# Patient Record
Sex: FEMALE | Race: WHITE | NOT HISPANIC OR LATINO | URBAN - METROPOLITAN AREA
[De-identification: names, ages, dates, MRNs, and addresses within clinical notes are randomized per-mention and may not be internally consistent; named-entity substitution may affect disease eponyms.]

---

## 2021-11-09 ENCOUNTER — APPOINTMENT (RX ONLY)
Dept: URBAN - METROPOLITAN AREA CLINIC 374 | Facility: CLINIC | Age: 36
Setting detail: DERMATOLOGY
End: 2021-11-09

## 2021-11-09 DIAGNOSIS — L64.8 OTHER ANDROGENIC ALOPECIA: ICD-10-CM

## 2021-11-09 DIAGNOSIS — Z41.9 ENCOUNTER FOR PROCEDURE FOR PURPOSES OTHER THAN REMEDYING HEALTH STATE, UNSPECIFIED: ICD-10-CM

## 2021-11-09 PROCEDURE — 11104 PUNCH BX SKIN SINGLE LESION: CPT

## 2021-11-09 PROCEDURE — ? ADDITIONAL NOTES

## 2021-11-09 PROCEDURE — ? MEDICAL CONSULTATION: CHEMICAL PEELS

## 2021-11-09 PROCEDURE — ? COUNSELING

## 2021-11-09 PROCEDURE — ? BIOPSY BY PUNCH METHOD

## 2021-11-09 ASSESSMENT — LOCATION DETAILED DESCRIPTION DERM: LOCATION DETAILED: LEFT SUPERIOR PARIETAL SCALP

## 2021-11-09 ASSESSMENT — LOCATION ZONE DERM: LOCATION ZONE: SCALP

## 2021-11-09 ASSESSMENT — LOCATION SIMPLE DESCRIPTION DERM: LOCATION SIMPLE: SCALP

## 2021-11-09 NOTE — PROCEDURE: ADDITIONAL NOTES
Detail Level: Zone
Additional Notes: Patient previously had lab work up showing iron deficiency and is currently taking a supplement
Render Risk Assessment In Note?: no

## 2021-11-09 NOTE — PROCEDURE: BIOPSY BY PUNCH METHOD
Detail Level: Detailed
Was A Bandage Applied: Yes
Punch Size In Mm: 3
Biopsy Type: H and E
Anesthesia Type: 1% lidocaine with epinephrine
Anesthesia Volume In Cc (Will Not Render If 0): 0.6
Additional Anesthesia Volume In Cc (Will Not Render If 0): 0
Hemostasis: Pro QR topical powder
Epidermal Sutures: none, closed by secondary intention
Wound Care: Vaseline
Dressing: no dressing applied
Patient Will Remove Sutures At Home?: No
Lab: -[id number]
Consent: Written consent was obtained and risks were reviewed including but not limited to scarring, infection, bleeding, scabbing, incomplete removal, nerve damage and allergy to anesthesia.
Post-Care Instructions: I reviewed with the patient in detail post-care instructions. Patient is to keep the biopsy site dry overnight, and then apply Vaseline or Aquaphor ointment twice daily until healed.
Home Suture Removal Text: Patient was provided a home suture removal kit and will remove their sutures at home.  If they have any questions or difficulties they will call the office.
Notification Instructions: Patient will be notified of biopsy results. However, patient instructed to call the office if not contacted within 2 weeks.
Billing Type: Third-Party Bill
Information: Selecting Yes will display possible errors in your note based on the variables you have selected. This validation is only offered as a suggestion for you. PLEASE NOTE THAT THE VALIDATION TEXT WILL BE REMOVED WHEN YOU FINALIZE YOUR NOTE. IF YOU WANT TO FAX A PRELIMINARY NOTE YOU WILL NEED TO TOGGLE THIS TO 'NO' IF YOU DO NOT WANT IT IN YOUR FAXED NOTE.

## 2021-11-09 NOTE — HPI: HAIR LOSS
Previous Labs: Yes
How Did The Hair Loss Occur?: sudden in onset
How Severe Is Your Hair Loss?: mild
When Were The Labs Drawn? (Drawn...): 10/1/21
Lab Details: Iron deficiency

## 2022-02-01 PROBLEM — F41.8 MIXED ANXIETY DEPRESSIVE DISORDER: Status: ACTIVE | Noted: 2022-02-01

## 2022-02-01 PROBLEM — M54.16 LUMBAR RADICULOPATHY: Status: ACTIVE | Noted: 2022-02-01

## 2022-02-01 ASSESSMENT — ENCOUNTER SYMPTOMS
RECTAL PAIN: 1
EYES NEGATIVE: 1
RESPIRATORY NEGATIVE: 1
PSYCHIATRIC NEGATIVE: 1
NEUROLOGICAL NEGATIVE: 1
ALLERGIC/IMMUNOLOGIC NEGATIVE: 1
CONSTITUTIONAL NEGATIVE: 1
ENDOCRINE NEGATIVE: 1
MUSCULOSKELETAL NEGATIVE: 1
HEMATOLOGIC/LYMPHATIC NEGATIVE: 1
CARDIOVASCULAR NEGATIVE: 1

## 2022-02-02 ENCOUNTER — OFFICE VISIT (OUTPATIENT)
Dept: PRIMARY CARE | Facility: CLINIC | Age: 37
End: 2022-02-02
Payer: COMMERCIAL

## 2022-02-02 VITALS
OXYGEN SATURATION: 99 % | SYSTOLIC BLOOD PRESSURE: 100 MMHG | DIASTOLIC BLOOD PRESSURE: 60 MMHG | TEMPERATURE: 98 F | RESPIRATION RATE: 16 BRPM | HEIGHT: 60 IN | WEIGHT: 107 LBS | BODY MASS INDEX: 21.01 KG/M2 | HEART RATE: 75 BPM

## 2022-02-02 DIAGNOSIS — Z13.9 SCREENING FOR CONDITION: ICD-10-CM

## 2022-02-02 DIAGNOSIS — Z00.00 GENERAL MEDICAL EXAM: Primary | ICD-10-CM

## 2022-02-02 DIAGNOSIS — K59.09 CHRONIC CONSTIPATION: ICD-10-CM

## 2022-02-02 DIAGNOSIS — E61.1 IRON DEFICIENCY: ICD-10-CM

## 2022-02-02 DIAGNOSIS — Z86.39 HISTORY OF VITAMIN D DEFICIENCY: ICD-10-CM

## 2022-02-02 DIAGNOSIS — Z83.3 FAMILY HISTORY OF DIABETES MELLITUS: ICD-10-CM

## 2022-02-02 DIAGNOSIS — F41.8 MIXED ANXIETY DEPRESSIVE DISORDER: ICD-10-CM

## 2022-02-02 PROCEDURE — 3008F BODY MASS INDEX DOCD: CPT | Performed by: STUDENT IN AN ORGANIZED HEALTH CARE EDUCATION/TRAINING PROGRAM

## 2022-02-02 PROCEDURE — 99395 PREV VISIT EST AGE 18-39: CPT | Mod: 25 | Performed by: STUDENT IN AN ORGANIZED HEALTH CARE EDUCATION/TRAINING PROGRAM

## 2022-02-02 PROCEDURE — 99204 OFFICE O/P NEW MOD 45 MIN: CPT | Performed by: STUDENT IN AN ORGANIZED HEALTH CARE EDUCATION/TRAINING PROGRAM

## 2022-02-02 RX ORDER — FLUOXETINE 10 MG/1
CAPSULE ORAL
COMMUNITY
Start: 2022-01-09 | End: 2022-07-27 | Stop reason: ALTCHOICE

## 2022-02-02 RX ORDER — FERROUS SULFATE 325(65) MG
TABLET ORAL
COMMUNITY
Start: 2022-01-09 | End: 2022-08-31

## 2022-02-02 ASSESSMENT — ENCOUNTER SYMPTOMS: CONSTIPATION: 1

## 2022-02-02 ASSESSMENT — PATIENT HEALTH QUESTIONNAIRE - PHQ9: SUM OF ALL RESPONSES TO PHQ9 QUESTIONS 1 & 2: 3

## 2022-02-02 NOTE — PROGRESS NOTES
"NEW PATIENT VISIT     WOMEN'S PRIMARY CARE   [facility]  TE STOCKTON M.D.  [phone]      HISTORY OF PRESENT ILLNESS - ASSESSMENT AND PLAN      CC:   Chief Complaint   Patient presents with   • Annual Exam     Linnette Rivera is a 36 y.o. female with a history of lumbar back pain with radiculopathy, chronic constipation, iron deficiency, anxiety and depression who presents for a new patient visit physical.     Moved from [place] in 2012, has PhD, lives with , no children currently.     Immunizations  Adult TDaP (every 10 years): Unsure when - has chart at home with vaccination record   COVID Vaccine: Up to date   Flu Vaccine (annual): Defers     Preventative Screening  Pap Smear (every 3-5 years until 65): Summer 2021 - [facility]  HIV testing: Negative with immigration to   Hep C testing: Never been tested that she is aware of     General Wellness  Nutrition: Well balanced - picky eater right now because she has braces, focusing on protein intake based on recent labwork   Physical Activity: Starting personal training 4x a week next week! Wants to start adding muscle.   Anxiety or depression: Yes - currently on Prozac  Seatbelt Safety: Always wears  Smoke Detector: Has in home     Specialists  Dentist for q6mo to annual exams: Yes  Eye Doctor for annual vision exams: Yes  Dermatologist for annual skin check: Yes - Dr. Diane Boone    Saw Derm for hair loss earlier this year, labs showed iron deficiency and \"low protein.\"     Saw Dr. Arriola at [facility] December 20, 2021 - note reviewed in Care Everywhere.   \"Anal pain and bleeding likely 2/2 anal fissures and hemorrhoids due to chronic constipation. No e/o fissures or hemorrhoids on anoscopy, but pt notes no pain over last few days. Symptoms most c/w anorectal fissures. Will pursue medical management and have pt RTC if sx do not improve after 6-8 weeks to discuss next steps.     We discussed the medical " "management for anal fissures. First, this involves avoiding hard stools and constipation. Stool softeners, fiber supplements, and/or miralax should be used. Depending on the location of the fissure, a topical calcium channel blocker can be used, such as nifedipine/lidocaine compound. This treatment takes up to 6 weeks for maximum effects. Sent Rx to pharmacy. If there is no improvement after this, then surgical treatment can be considered. Fiber and fissure handouts given.\"     Assessment   Problem List Items Addressed This Visit        Digestive    Chronic constipation (Chronic)    Current Assessment & Plan     Chronic, improving.  Continue fiber and water intake as recommended by Bernardo THOMPSON.          Iron deficiency    Current Assessment & Plan     Continue oral iron supplementation.   Lab recheck with Dr. Boone as planned.             Other    Mixed anxiety depressive disorder (Chronic)    Current Assessment & Plan     Chronic, stable.  Continue current Prozac as prescribed.            Relevant Medications    FLUoxetine (PROzac) 10 mg capsule    General medical exam - Primary    Current Assessment & Plan     Reviewed and updated all medical history, family history, social history, medications, and ensured patient up-to-date on all relevant immunizations and screening as indicated in orders below.   Asked patient to obtain prior vaccination records as indicated above.  Counseled patient on age-appropriate healthy habits:  Drink unsweetened beverages and aim to get 6-8 glasses of water daily.   Avoid regular use of ultra-processed foods, added nitrates or nitrites, sweetened beverages, and artifical sweeteners (sucralose, splenda, equal, sweet-n-low), as they dramatically increase insulin and  worsen insulin resistance. Insulin resistance is a the most common risk factor for weight dysregulation in the [place].   Avoid unnecessary antibiotics for yourself and in the meats and dairy products you use.  Limit " alcohol to less than 3 servings in a week.  Achieve adequate NEAT (Non-Exercise Activity Thermogenesis) every day: hourly movement  while awake for at least 250 steps (approximately 5 minutes out of each hour).  Hourly movement prevents metabolic slow down that can happen with prolonged sitting.   Participate in regular dedicated physical activity  --- at least 150 minutes per week. Make at least 60 of those physical activity minutes resistance training.   Identify personal stressors.  Modify what you can, and aim to balance what you cannot. Develop and practice daily relaxation techniques to balance stress.  Make time for rejuvenating activities and hobbies at least weekly.   Get adequate and restful sleep every night ---  7.5-8.5 hours a night is what most people require.  Consider a scheduled sleep and wake time if possible.                Family history of diabetes mellitus    Current Assessment & Plan     Patient requests to check fasting blood sugar.          Relevant Orders    Basic metabolic panel      Other Visit Diagnoses     Screening for condition        Relevant Orders    Lipid panel    Hepatitis C antibody    HIV 1,2 AB P24 AG    History of vitamin D deficiency        Relevant Orders    Vitamin D 25 hydroxy           PAST MEDICAL AND SURGICAL HISTORY        Past Medical History:   Diagnosis Date   • Chronic constipation 2/2/2022   • Iron deficiency 2/2/2022   • Lumbar radiculopathy 2/1/2022   • Mixed anxiety depressive disorder 2/1/2022       No past surgical history on file.  MEDICATIONS          Current Outpatient Medications:   •  ferrous sulfate 325 mg (65 mg iron) tablet, TAKE 1 DAILY INCREASE FIBER AND WATER INTAKE, Disp: , Rfl:   •  FLUoxetine (PROzac) 10 mg capsule, Take by mouth once daily., Disp: , Rfl:   ALLERGIES        Thimerosal  FAMILY HISTORY        Family History   Problem Relation Age of Onset   • Diabetes Biological Mother    • Hypertension Biological Mother    • Stroke Biological  Mother    • Alcohol abuse Biological Father      SOCIAL/ TOBACCO HISTORY        Social History     Tobacco Use   • Smoking status: Never Smoker   • Smokeless tobacco: Never Used   Substance Use Topics   • Alcohol use: Not Currently   • Drug use: Never     REVIEW OF SYSTEMS        Review of Systems   Constitutional: Negative.    HENT: Negative.    Eyes: Negative.    Respiratory: Negative.    Cardiovascular: Negative.    Gastrointestinal: Positive for constipation and rectal pain.   Endocrine: Negative.    Genitourinary: Negative.    Musculoskeletal: Negative.    Skin: Negative.    Allergic/Immunologic: Negative.    Neurological: Negative.    Hematological: Negative.    Psychiatric/Behavioral: Negative.       PHYSICAL EXAMINATION      Visit Vitals  /60   Pulse 75   Temp 36.7 °C (98 °F)   Resp 16   Ht 1.524 m (5')   Wt 48.5 kg (107 lb)   SpO2 99%   BMI 20.90 kg/m²        Physical Exam  Constitutional:       General: She is awake.      Appearance: Normal appearance. She is well-groomed.   HENT:      Head: Normocephalic and atraumatic.   Eyes:      General: Lids are normal.      Extraocular Movements: Extraocular movements intact.      Conjunctiva/sclera: Conjunctivae normal.   Neck:      Trachea: Phonation normal.   Pulmonary:      Effort: Pulmonary effort is normal.      Breath sounds: Normal breath sounds.   Skin:     General: Skin is warm and dry.   Neurological:      General: No focal deficit present.      Mental Status: She is alert and oriented to person, place, and time. Mental status is at baseline.   Psychiatric:         Attention and Perception: Attention and perception normal.         Mood and Affect: Mood and affect normal.         Speech: Speech normal.         Behavior: Behavior normal. Behavior is cooperative.         Thought Content: Thought content normal.         Cognition and Memory: Cognition and memory normal.         Judgment: Judgment normal.       PRIOR LABS        No results found for:  HGBA1C  No results found for: CHOL  No results found for: HDL  No results found for: LDLCALC  No results found for: TRIG  No results found for: CHOLHDL  No results found for: TSH  No results found for: WBC, HGB, HCT, MCV, PLT  No results found for: NA, K, CL, CO2, BUN, CREATININE, GLUCOSE, AST, ALT, PROTEIN, ALBUMIN, BILITOT, EGFR, ANIONGAP    TIME   I spent 45 minutes on this date of service performing the following activities: obtaining history, performing examination, entering orders, documenting, preparing for visit, obtaining / reviewing records and providing counseling and education.    Aspen Shine MD  2/2/2022

## 2022-02-02 NOTE — Clinical Note
Can we get a copy of her last pap Summer 2021 - [facility]

## 2022-02-02 NOTE — ASSESSMENT & PLAN NOTE
Reviewed and updated all medical history, family history, social history, medications, and ensured patient up-to-date on all relevant immunizations and screening as indicated in orders below.   Asked patient to obtain prior vaccination records as indicated above.  Counseled patient on age-appropriate healthy habits:  Drink unsweetened beverages and aim to get 6-8 glasses of water daily.   Avoid regular use of ultra-processed foods, added nitrates or nitrites, sweetened beverages, and artifical sweeteners (sucralose, splenda, equal, sweet-n-low), as they dramatically increase insulin and  worsen insulin resistance. Insulin resistance is a the most common risk factor for weight dysregulation in the [place].   Avoid unnecessary antibiotics for yourself and in the meats and dairy products you use.  Limit alcohol to less than 3 servings in a week.  Achieve adequate NEAT (Non-Exercise Activity Thermogenesis) every day: hourly movement  while awake for at least 250 steps (approximately 5 minutes out of each hour).  Hourly movement prevents metabolic slow down that can happen with prolonged sitting.   Participate in regular dedicated physical activity  --- at least 150 minutes per week. Make at least 60 of those physical activity minutes resistance training.   Identify personal stressors.  Modify what you can, and aim to balance what you cannot. Develop and practice daily relaxation techniques to balance stress.  Make time for rejuvenating activities and hobbies at least weekly.   Get adequate and restful sleep every night ---  7.5-8.5 hours a night is what most people require.  Consider a scheduled sleep and wake time if possible.

## 2022-02-02 NOTE — PATIENT INSTRUCTIONS
Please send me a [facility] Chart message with your vaccine record - I specifically want to know to know your last TDAP date. If you got your last TDAP greater than 10 years ago you are due.     TDaP vaccine which prevents infection with tetanus, diphtheria, and pertussis. This vaccine is recommended by the CDC every 10 years for adults. Please call your insurance to find out if you are covered for TDaP in a doctors office or a pharmacy. If you are covered for this vaccine in a doctors office, you can call or send a [facility] Chart message to schedule a vaccine slot to come in and receive the vaccine. You can receive this vaccine at the same time as other vaccines if needed. If you are not covered in a doctor's office, please receive this vaccine at a pharmacy and send me proof of vaccination to update your healthcare maintenance in your chart.     Diphtheria and pertussis spread from person to person. Tetanus enters the body through cuts or wounds.  TETANUS (T) causes painful stiffening of the muscles. Tetanus can lead to serious health problems, including being unable to open the mouth, having trouble swallowing and breathing, or death.  DIPHTHERIA (D) can lead to difficulty breathing, heart failure, paralysis, or death.  PERTUSSIS (aP), also known as “whooping cough,” can cause uncontrollable, violent coughing that makes it hard to breathe, eat, or drink. Pertussis can be extremely serious especially in babies and young children, causing pneumonia, convulsions, brain damage, or death. In teens and adults, it can cause weight loss, loss of bladder control, passing out, and rib fractures from severe coughing.

## 2022-05-17 DIAGNOSIS — E55.9 VITAMIN D DEFICIENCY: Primary | ICD-10-CM

## 2022-05-18 PROBLEM — E55.9 VITAMIN D DEFICIENCY: Status: ACTIVE | Noted: 2022-05-18

## 2022-05-18 LAB
25(OH)D3+25(OH)D2 SERPL-MCNC: 5.8 NG/ML (ref 30–100)
BUN SERPL-MCNC: 11 MG/DL (ref 6–20)
BUN/CREAT SERPL: 18 (ref 9–23)
CALCIUM SERPL-MCNC: 9.6 MG/DL (ref 8.7–10.2)
CHLORIDE SERPL-SCNC: 104 MMOL/L (ref 96–106)
CHOLEST SERPL-MCNC: 181 MG/DL (ref 100–199)
CO2 SERPL-SCNC: 24 MMOL/L (ref 20–29)
CREAT SERPL-MCNC: 0.62 MG/DL (ref 0.57–1)
EGFRCR SERPLBLD CKD-EPI 2021: 117 ML/MIN/1.73
GLUCOSE SERPL-MCNC: 81 MG/DL (ref 65–99)
HCV AB S/CO SERPL IA: 0.1 S/CO RATIO (ref 0–0.9)
HDLC SERPL-MCNC: 63 MG/DL
HIV 1+2 AB+HIV1 P24 AG SERPL QL IA: NON REACTIVE
LDLC SERPL CALC-MCNC: 104 MG/DL (ref 0–99)
POTASSIUM SERPL-SCNC: 4.3 MMOL/L (ref 3.5–5.2)
SODIUM SERPL-SCNC: 140 MMOL/L (ref 134–144)
TRIGL SERPL-MCNC: 77 MG/DL (ref 0–149)
VLDLC SERPL CALC-MCNC: 14 MG/DL (ref 5–40)

## 2022-05-18 RX ORDER — ASPIRIN 325 MG
50000 TABLET, DELAYED RELEASE (ENTERIC COATED) ORAL WEEKLY
Qty: 4 CAPSULE | Refills: 0 | Status: SHIPPED | OUTPATIENT
Start: 2022-05-18 | End: 2022-07-27 | Stop reason: ALTCHOICE

## 2022-06-09 ENCOUNTER — TELEPHONE (OUTPATIENT)
Dept: PRIMARY CARE | Facility: CLINIC | Age: 37
End: 2022-06-09
Payer: COMMERCIAL

## 2022-06-09 DIAGNOSIS — E55.9 VITAMIN D DEFICIENCY: Primary | ICD-10-CM

## 2022-06-09 DIAGNOSIS — E55.9 VITAMIN D DEFICIENCY: ICD-10-CM

## 2022-06-09 RX ORDER — ASPIRIN 325 MG
50000 TABLET, DELAYED RELEASE (ENTERIC COATED) ORAL WEEKLY
Qty: 4 CAPSULE | Refills: 0 | OUTPATIENT
Start: 2022-06-09 | End: 2022-07-01

## 2022-06-09 NOTE — TELEPHONE ENCOUNTER
[facility] to call patient and let her know after she has completed Rx for  Vitamin D3 50,000IU weekly for 4 weeks, she should continue taking either 2,000 IU daily or 10,000 IU weekly, this can be obtained over the counter. I would like for her to go back to the lab for us to recheck her vitamin D in 2-3 months. Order is on file.

## 2022-06-09 NOTE — TELEPHONE ENCOUNTER
Last Medical provider visit:           Last Dietitian or EP visit:    Next visit:   Comments: Refill Vit D3

## 2022-06-09 NOTE — TELEPHONE ENCOUNTER
Please call patient and let her know after she has completed Rx for  Vitamin D3 50,000IU weekly for 4 weeks, she should continue taking either 2,000 IU daily or 10,000 IU weekly, this can be obtained over the counter. I would like for her to go back to the lab for us to recheck her vitamin D in 2-3 months. Order is on file. Thank you!

## 2022-06-10 NOTE — TELEPHONE ENCOUNTER
Left voicemail to complete prescription Vit D3 for four week, thereafter 2000 IU daliy or 69048 IU weekly.Recheck Vit D in two-three months.

## 2022-07-27 ENCOUNTER — TELEPHONE (OUTPATIENT)
Dept: PRIMARY CARE | Facility: CLINIC | Age: 37
End: 2022-07-27

## 2022-07-27 ENCOUNTER — OFFICE VISIT (OUTPATIENT)
Dept: PRIMARY CARE | Facility: CLINIC | Age: 37
End: 2022-07-27
Payer: COMMERCIAL

## 2022-07-27 VITALS
HEART RATE: 70 BPM | BODY MASS INDEX: 20.03 KG/M2 | WEIGHT: 102 LBS | RESPIRATION RATE: 16 BRPM | DIASTOLIC BLOOD PRESSURE: 60 MMHG | HEIGHT: 60 IN | TEMPERATURE: 97.6 F | SYSTOLIC BLOOD PRESSURE: 108 MMHG | OXYGEN SATURATION: 99 %

## 2022-07-27 DIAGNOSIS — E55.9 VITAMIN D DEFICIENCY: ICD-10-CM

## 2022-07-27 DIAGNOSIS — F41.8 MIXED ANXIETY DEPRESSIVE DISORDER: Primary | ICD-10-CM

## 2022-07-27 PROCEDURE — 99214 OFFICE O/P EST MOD 30 MIN: CPT | Performed by: STUDENT IN AN ORGANIZED HEALTH CARE EDUCATION/TRAINING PROGRAM

## 2022-07-27 PROCEDURE — 3008F BODY MASS INDEX DOCD: CPT | Performed by: STUDENT IN AN ORGANIZED HEALTH CARE EDUCATION/TRAINING PROGRAM

## 2022-07-27 RX ORDER — ELAGOLIX 150 MG/1
1 TABLET, FILM COATED ORAL
COMMUNITY
Start: 2022-06-25 | End: 2022-08-31

## 2022-07-27 RX ORDER — SERTRALINE HYDROCHLORIDE 25 MG/1
25 TABLET, FILM COATED ORAL DAILY
Qty: 30 TABLET | Refills: 0 | Status: SHIPPED | OUTPATIENT
Start: 2022-07-27 | End: 2022-08-31 | Stop reason: SDUPTHER

## 2022-07-27 RX ORDER — SPIRONOLACTONE 25 MG/1
25 TABLET ORAL 2 TIMES DAILY WITH MEALS
COMMUNITY
Start: 2022-06-25 | End: 2022-08-31

## 2022-07-27 RX ORDER — ESCITALOPRAM OXALATE 5 MG/1
5 TABLET ORAL
COMMUNITY
Start: 2022-05-20 | End: 2022-07-27 | Stop reason: ALTCHOICE

## 2022-07-27 RX ORDER — VIT C/E/ZN/COPPR/LUTEIN/ZEAXAN 250MG-90MG
10000 CAPSULE ORAL WEEKLY
Qty: 12 CAPSULE | Refills: 3 | Status: SHIPPED | OUTPATIENT
Start: 2022-07-27 | End: 2022-08-31 | Stop reason: ALTCHOICE

## 2022-07-27 ASSESSMENT — ENCOUNTER SYMPTOMS
CONSTITUTIONAL NEGATIVE: 1
CARDIOVASCULAR NEGATIVE: 1
GASTROINTESTINAL NEGATIVE: 1
ENDOCRINE NEGATIVE: 1
MUSCULOSKELETAL NEGATIVE: 1
NEUROLOGICAL NEGATIVE: 1
RESPIRATORY NEGATIVE: 1
DYSPHORIC MOOD: 1
ALLERGIC/IMMUNOLOGIC NEGATIVE: 1
EYES NEGATIVE: 1
HEMATOLOGIC/LYMPHATIC NEGATIVE: 1
NERVOUS/ANXIOUS: 1

## 2022-07-27 NOTE — TELEPHONE ENCOUNTER
Linnette called to schedule an appointment to discuss her anti depressant medication and a possible change. She does not want to wait until October for an office visit. Please call patient back and advise.

## 2022-07-27 NOTE — PROGRESS NOTES
ESTABLISHED PATIENT OFFICE VISIT    WOMEN'S PRIMARY CARE   [facility] [place] TE STOCKTON M.D.  [phone]      HPI - ASSESSMENT AND PLAN      CC:   Chief Complaint   Patient presents with   • Depression     Linnette Rivera is a 36 y.o. female with a history of lumbar back pain with radiculopathy, chronic constipation, iron deficiency, anxiety and depression who presents for follow up.     She was last seen in the office 2/2/2022 to establish care.     She was diagnosed with depression in [id number]. Took her Initially took Lexapro 5mg for a few months in 2016 and felt better. Then felt symptoms come back and was also started on Prozac 10mg daily. She had stopped all medications in 2018 and had recurrence of symptoms in 2020, she was restarted on Lexapro and then Prozac added again. She no longer wants to be on medications. She has now been on Lexapro for 1.5 years and Prozac for 1 year. She has been reducing her doses on her own for 1 months. She is taking Prozac twice a week and Lexapro once a week. Takes both Prozac and Lexapro one day a week and then Prozac a second day later in the week. This was the taper her prior PCP recommended. She is not having any side effects currently. Feels like she is having changes in her moods, related to Orlissa which she is taking for endometriosis for the past 4-5 months. This has relieved her pain, but having lots of mood side effects.   Met with a therapist in [place] last week via [employer] - therapist feels like she has excessive anxiety causing depression and wants her to try a new medication.  Previously had SE to Wellbutrin. Never taken Zoloft.   Previously was on a higher dose of Prozac - 20mg caused insomnia and nightmares.   Not taking Vitamin D anymore - requests an Rx because she cannot remember to purchase OTC.      Assessment   Problem List Items Addressed This Visit        Digestive    Vitamin D deficiency     Restart Vitamin D 10,000K IU weekly with a meal  containing fat, get Vitamin D level rechecked in 3 months.              Relevant Medications    cholecalciferol (VITAMIN D3) 250 mcg (10,000 unit) capsule    Other Relevant Orders    Vitamin D 25 hydroxy       Other    Mixed anxiety depressive disorder - Primary (Chronic)     STOP Prozac and Escitalopram - do not take any more tablets.   Given her significant symptoms, we discussed trial of Zoloft 12.5mg daily for one week with plan to uptitrate to 25mg daily after one week.   Counseled patient on possible side effects of headache, nausea, diarrhea, dry mouth, increased sweating, feeling nervous, restless, fatigued, sleepy or having trouble sleeping (insomnia), these will often improve over the first week or two as you continue to take the medication.            Relevant Medications    sertraline (ZOLOFT) 25 mg tablet             PAST MEDICAL AND SURGICAL HISTORY        Past Medical History:   Diagnosis Date   • Chronic constipation 2/2/2022   • Iron deficiency 2/2/2022   • Lumbar radiculopathy 2/1/2022   • Mixed anxiety depressive disorder 2/1/2022   • Vitamin D deficiency 5/18/2022       No past surgical history on file.  MEDICATIONS          Current Outpatient Medications:   •  cholecalciferol (VITAMIN D3) 250 mcg (10,000 unit) capsule, Take 1 capsule (10,000 Units total) by mouth once a week., Disp: 12 capsule, Rfl: 3  •  ORILISSA 150 mg tablet, Take 1 tablet by mouth once daily., Disp: , Rfl:   •  sertraline (ZOLOFT) 25 mg tablet, Take 1 tablet (25 mg total) by mouth daily. Take 1/2 tablet by mouth daily for 1 week, then increase to 1 tablet daily., Disp: 30 tablet, Rfl: 0  •  spironolactone (ALDACTONE) 25 mg tablet, Take 25 mg by mouth 2 (two) times a day with meals., Disp: , Rfl:   •  ferrous sulfate 325 mg (65 mg iron) tablet, TAKE 1 DAILY INCREASE FIBER AND WATER INTAKE, Disp: , Rfl:   ALLERGIES        Thimerosal  FAMILY HISTORY        Family History   Problem Relation Age of Onset   • Diabetes Biological  Mother    • Hypertension Biological Mother    • Stroke Biological Mother    • Alcohol abuse Biological Father      SOCIAL/ TOBACCO HISTORY        Social History     Tobacco Use   • Smoking status: Never Smoker   • Smokeless tobacco: Never Used   Substance Use Topics   • Alcohol use: Not Currently   • Drug use: Never     REVIEW OF SYSTEMS        Review of Systems   Constitutional: Negative.    HENT: Negative.    Eyes: Negative.    Respiratory: Negative.    Cardiovascular: Negative.    Gastrointestinal: Negative.    Endocrine: Negative.    Genitourinary: Negative.    Musculoskeletal: Negative.    Skin: Negative.    Allergic/Immunologic: Negative.    Neurological: Negative.    Hematological: Negative.    Psychiatric/Behavioral: Positive for dysphoric mood. The patient is nervous/anxious.       PHYSICAL EXAMINATION     Visit Vitals  /60   Pulse 70   Temp 36.4 °C (97.6 °F)   Resp 16   Ht 1.524 m (5')   Wt 46.3 kg (102 lb)   SpO2 99%   BMI 19.92 kg/m²        Physical Exam  Constitutional:       General: She is awake.      Appearance: Normal appearance. She is well-groomed.   HENT:      Head: Normocephalic and atraumatic.   Eyes:      General: Lids are normal.      Extraocular Movements: Extraocular movements intact.      Conjunctiva/sclera: Conjunctivae normal.   Neck:      Trachea: Phonation normal.   Pulmonary:      Effort: Pulmonary effort is normal.      Breath sounds: Normal breath sounds.   Skin:     General: Skin is warm and dry.   Neurological:      General: No focal deficit present.      Mental Status: She is alert and oriented to person, place, and time. Mental status is at baseline.   Psychiatric:         Attention and Perception: Attention and perception normal.         Mood and Affect: Mood and affect normal.         Speech: Speech normal.         Behavior: Behavior normal. Behavior is cooperative.         Thought Content: Thought content normal.         Cognition and Memory: Cognition and memory  normal.         Judgment: Judgment normal.        TIME   I spent 25 minutes on this date of service performing the following activities: obtaining history, performing examination, entering orders, documenting, preparing for visit, obtaining / reviewing records and providing counseling and education.    Aspen Shine MD  7/27/2022

## 2022-07-27 NOTE — PATIENT INSTRUCTIONS
STOP Prozac and Escitalopram - do not take any more tablets.   Given her significant symptoms, we discussed trial of Zoloft 12.5mg daily for one week with plan to uptitrate to 25mg daily after one week.   Counseled patient on possible side effects of headache, nausea, diarrhea, dry mouth, increased sweating, feeling nervous, restless, fatigued, sleepy or having trouble sleeping (insomnia), these will often improve over the first week or two as you continue to take the medication.   Restart Vitamin D 10,000K IU weekly with a meal containing fat, get Vitamin D level rechecked in 3 months.

## 2022-07-27 NOTE — ASSESSMENT & PLAN NOTE
Restart Vitamin D 10,000K IU weekly with a meal containing fat, get Vitamin D level rechecked in 3 months.

## 2022-07-27 NOTE — ASSESSMENT & PLAN NOTE
STOP Prozac and Escitalopram - do not take any more tablets.   Given her significant symptoms, we discussed trial of Zoloft 12.5mg daily for one week with plan to uptitrate to 25mg daily after one week.   Counseled patient on possible side effects of headache, nausea, diarrhea, dry mouth, increased sweating, feeling nervous, restless, fatigued, sleepy or having trouble sleeping (insomnia), these will often improve over the first week or two as you continue to take the medication.

## 2022-08-31 ENCOUNTER — TELEMEDICINE (OUTPATIENT)
Dept: PRIMARY CARE | Facility: CLINIC | Age: 37
End: 2022-08-31
Payer: COMMERCIAL

## 2022-08-31 DIAGNOSIS — F41.8 MIXED ANXIETY DEPRESSIVE DISORDER: Primary | ICD-10-CM

## 2022-08-31 PROCEDURE — 99214 OFFICE O/P EST MOD 30 MIN: CPT | Mod: 95 | Performed by: STUDENT IN AN ORGANIZED HEALTH CARE EDUCATION/TRAINING PROGRAM

## 2022-08-31 RX ORDER — SERTRALINE HYDROCHLORIDE 25 MG/1
25 TABLET, FILM COATED ORAL DAILY
Qty: 90 TABLET | Refills: 1 | Status: SHIPPED | OUTPATIENT
Start: 2022-08-31 | End: 2022-11-22 | Stop reason: SDUPTHER

## 2022-09-01 ENCOUNTER — TELEPHONE (OUTPATIENT)
Dept: PRIMARY CARE | Facility: CLINIC | Age: 37
End: 2022-09-01
Payer: COMMERCIAL

## 2022-11-22 ENCOUNTER — TELEPHONE (OUTPATIENT)
Dept: PRIMARY CARE | Facility: CLINIC | Age: 37
End: 2022-11-22
Payer: COMMERCIAL

## 2022-11-22 DIAGNOSIS — F41.8 MIXED ANXIETY DEPRESSIVE DISORDER: ICD-10-CM

## 2022-11-22 RX ORDER — SERTRALINE HYDROCHLORIDE 25 MG/1
25 TABLET, FILM COATED ORAL DAILY
Qty: 90 TABLET | Refills: 1 | Status: SHIPPED | OUTPATIENT
Start: 2022-11-22 | End: 2022-12-21 | Stop reason: DRUGHIGH

## 2022-11-22 NOTE — TELEPHONE ENCOUNTER
Medicine Refill Request    Last Office: 7/27/2022   Last Consult Visit: Visit date not found  Last Telemedicine Visit: 8/31/2022 Aspen Shine MD    Next Appointment: 12/21/2022      Current Outpatient Medications:     sertraline (ZOLOFT) 25 mg tablet, Take 1 tablet (25 mg total) by mouth daily., Disp: 90 tablet, Rfl: 1

## 2022-12-20 ASSESSMENT — ENCOUNTER SYMPTOMS
NEUROLOGICAL NEGATIVE: 1
RESPIRATORY NEGATIVE: 1
CARDIOVASCULAR NEGATIVE: 1
CONSTITUTIONAL NEGATIVE: 1
ALLERGIC/IMMUNOLOGIC NEGATIVE: 1
ENDOCRINE NEGATIVE: 1
EYES NEGATIVE: 1
GASTROINTESTINAL NEGATIVE: 1
MUSCULOSKELETAL NEGATIVE: 1
HEMATOLOGIC/LYMPHATIC NEGATIVE: 1

## 2022-12-20 NOTE — PROGRESS NOTES
ESTABLISHED PATIENT OFFICE VISIT    WOM[facility]S PRIMARY [facility]   [facility] [place] [facility] M.D.  [phone]      HPI - ASSESSMENT AND PLAN      CC:   Chief Complaint   Patient presents with   • Med Management     Linnette Rivera is a 37 y.o. female with a history of lumbar back pain with radiculopathy, chronic constipation, iron deficiency, anxiety and depression who presents for follow up.     She was last seen in the office 7/27/2022  -Continued on Zoloft 25mg  -Encouraged follow up with Uzma Marin    Since that visit:  -She was not able to follow up with Uzma Marin, but is interested in establishing care with a therapist at this time.  -Changed her job and she is happy with her new position, but still feeling not her best and would be interested in increasing Zoloft, feels like her depression is getting worse, she is in her head a lot criticizing herself, feels like her mind is racing. She is overthinking and catastrophizing.     Assessment   Problem List Items Addressed This Visit        Mental Health    Mixed anxiety depressive disorder - Primary (Chronic)     Increase Zoloft to 50mg, you can take 2 of your current tablets and  Rx I sent in today.     For therapy I recommend calling [facility]: [phone]  OR  [facility]: [phone]  Select: Option #2 then Option #4  For more information visit [email or web address]          Relevant Medications    sertraline (ZOLOFT) 50 mg tablet          PAST MEDICAL AND SURGICAL HISTORY        Past Medical History:   Diagnosis Date   • Chronic constipation 2/2/2022   • Iron deficiency 2/2/2022   • Lumbar radiculopathy 2/1/2022   • Mixed anxiety depressive disorder 2/1/2022   • Vitamin D deficiency 5/18/2022       No past surgical history on file.  MEDICATIONS          Current Outpatient Medications:   •  sertraline (ZOLOFT) 50 mg tablet, Take 1 tablet (50 mg total) by mouth daily., Disp: 90 tablet,  Rfl: 0  ALLERGIES        Thimerosal  FAMILY HISTORY        Family History   Problem Relation Age of Onset   • Diabetes Biological Mother    • Hypertension Biological Mother    • Stroke Biological Mother    • Alcohol abuse Biological Father      SOCIAL/ TOBACCO HISTORY        Social History     Tobacco Use   • Smoking status: Never   • Smokeless tobacco: Never   Substance Use Topics   • Alcohol use: Not Currently   • Drug use: Never     REVIEW OF SYSTEMS        Review of Systems   Constitutional: Negative.    HENT: Negative.    Eyes: Negative.    Respiratory: Negative.    Cardiovascular: Negative.    Gastrointestinal: Negative.    Endocrine: Negative.    Genitourinary: Negative.    Musculoskeletal: Negative.    Skin: Negative.    Allergic/Immunologic: Negative.    Neurological: Negative.    Hematological: Negative.    Psychiatric/Behavioral: Positive for dysphoric mood. The patient is nervous/anxious.       PHYSICAL EXAMINATION     Visit Vitals  /70   Pulse 63   Temp 36.4 °C (97.6 °F)   Resp 16   Ht 1.524 m (5')   Wt 48.5 kg (107 lb)   SpO2 99%   BMI 20.90 kg/m²        Physical Exam  Constitutional:       General: She is awake.      Appearance: Normal appearance. She is well-groomed.   HENT:      Head: Normocephalic and atraumatic.   Eyes:      General: Lids are normal.      Extraocular Movements: Extraocular movements intact.      Conjunctiva/sclera: Conjunctivae normal.   Neck:      Trachea: Phonation normal.   Pulmonary:      Effort: Pulmonary effort is normal.      Breath sounds: Normal breath sounds.   Skin:     General: Skin is warm and dry.   Neurological:      General: No focal deficit present.      Mental Status: She is alert and oriented to person, place, and time. Mental status is at baseline.   Psychiatric:         Attention and Perception: Attention and perception normal.         Mood and Affect: Mood and affect normal.         Speech: Speech normal.         Behavior: Behavior normal. Behavior is  cooperative.         Thought Content: Thought content normal.         Cognition and Memory: Cognition and memory normal.         Judgment: Judgment normal.        TIME   I spent 15 minutes on this date of service performing the following activities: obtaining history, performing examination, entering orders, documenting, preparing for visit and providing counseling and education.    Aspen Shine MD  12/21/2022

## 2022-12-21 ENCOUNTER — OFFICE VISIT (OUTPATIENT)
Dept: PRIMARY CARE | Facility: CLINIC | Age: 37
End: 2022-12-21
Payer: COMMERCIAL

## 2022-12-21 VITALS
HEART RATE: 63 BPM | DIASTOLIC BLOOD PRESSURE: 70 MMHG | OXYGEN SATURATION: 99 % | RESPIRATION RATE: 16 BRPM | WEIGHT: 107 LBS | SYSTOLIC BLOOD PRESSURE: 108 MMHG | BODY MASS INDEX: 21.01 KG/M2 | TEMPERATURE: 97.6 F | HEIGHT: 60 IN

## 2022-12-21 DIAGNOSIS — F41.8 MIXED ANXIETY DEPRESSIVE DISORDER: Primary | Chronic | ICD-10-CM

## 2022-12-21 PROCEDURE — 99214 OFFICE O/P EST MOD 30 MIN: CPT | Performed by: STUDENT IN AN ORGANIZED HEALTH CARE EDUCATION/TRAINING PROGRAM

## 2022-12-21 PROCEDURE — 3008F BODY MASS INDEX DOCD: CPT | Performed by: STUDENT IN AN ORGANIZED HEALTH CARE EDUCATION/TRAINING PROGRAM

## 2022-12-21 RX ORDER — SERTRALINE HYDROCHLORIDE 50 MG/1
50 TABLET, FILM COATED ORAL DAILY
Qty: 90 TABLET | Refills: 0 | Status: SHIPPED | OUTPATIENT
Start: 2022-12-21 | End: 2023-02-21 | Stop reason: SDUPTHER

## 2022-12-21 ASSESSMENT — ENCOUNTER SYMPTOMS
NERVOUS/ANXIOUS: 1
DYSPHORIC MOOD: 1

## 2022-12-21 NOTE — PATIENT INSTRUCTIONS
Increase Zoloft to 50mg, you can take 2 of your current tablets and  Rx I sent in today.     For therapy I recommend calling [facility]: [phone]  OR  [facility]: [phone]  Select: Option #2 then Option #4  For more information visit [email or web address]

## 2022-12-21 NOTE — ASSESSMENT & PLAN NOTE
Increase Zoloft to 50mg, you can take 2 of your current tablets and  Rx I sent in today.     For therapy I recommend calling [facility]: [phone]  OR  [facility]: [phone]  Select: Option #2 then Option #4  For more information visit [email or web address]

## 2023-01-11 ENCOUNTER — TELEMEDICINE (OUTPATIENT)
Dept: PRIMARY CARE | Facility: CLINIC | Age: 38
End: 2023-01-11
Payer: COMMERCIAL

## 2023-01-11 DIAGNOSIS — Z30.011 BCP (BIRTH CONTROL PILLS) INITIATION: Primary | ICD-10-CM

## 2023-01-11 PROCEDURE — 99214 OFFICE O/P EST MOD 30 MIN: CPT | Mod: 95 | Performed by: STUDENT IN AN ORGANIZED HEALTH CARE EDUCATION/TRAINING PROGRAM

## 2023-01-11 RX ORDER — NORETHINDRONE 0.35 MG/1
1 TABLET ORAL DAILY
Qty: 28 TABLET | Refills: 11 | Status: SHIPPED | OUTPATIENT
Start: 2023-01-11 | End: 2023-02-08 | Stop reason: SDUPTHER

## 2023-01-11 RX ORDER — NORETHINDRONE 5 MG/1
5 TABLET ORAL DAILY
Qty: 14 TABLET | Refills: 0 | Status: SHIPPED | OUTPATIENT
Start: 2023-01-11 | End: 2023-03-22 | Stop reason: ALTCHOICE

## 2023-01-11 ASSESSMENT — ENCOUNTER SYMPTOMS
HEMATOLOGIC/LYMPHATIC NEGATIVE: 1
GASTROINTESTINAL NEGATIVE: 1
NEUROLOGICAL NEGATIVE: 1
EYES NEGATIVE: 1
CARDIOVASCULAR NEGATIVE: 1
CONSTITUTIONAL NEGATIVE: 1
MUSCULOSKELETAL NEGATIVE: 1
ALLERGIC/IMMUNOLOGIC NEGATIVE: 1
ENDOCRINE NEGATIVE: 1
PSYCHIATRIC NEGATIVE: 1
RESPIRATORY NEGATIVE: 1

## 2023-01-11 NOTE — PROGRESS NOTES
ESTABLISHED PATIENT TELEMED VISIT    WOMEN'S PRIMARY CARE   [facility]  ASPEN SHINE M.D.  [phone]      HPI - ASSESSMENT AND PLAN      Verification of Patient Location:  The patient affirms they are currently located in the following state: [place]     Audio and Video Encounter   Luz Marina, my name is Aspen Shine MD.  Before we proceed, can you please verify your identification by telling me your full name and date of birth?  Can you tell me who is in the room with you?    You and I are about to have a telemedicine check-in or visit because you have requested it.  This is a live video-conference.  I am a real person, speaking to you in real time.  There is no one else with me on the video-conference. I am not recording this conversation and I am asking you not to record it.  This telemedicine visit will be billed to your health insurance or you, if you are self-insured.  You understand you will be responsible for any copayments or coinsurances that apply to your telemedicine visit.  Communication platform used for this encounter:  [employer] Video Visit (Epic Video Client)       Before starting our telemedicine visit, I am required to get your consent for this virtual check-in or visit by telemedicine. Do you consent?     Patient Response to Request for Consent: Yes     CC:   Chief Complaint   Patient presents with   • Contraception     Linnette Rivera is a 37 y.o. female with a history of lumbar back pain with radiculopathy, chronic constipation, iron deficiency, anxiety and depression who presents for follow up to discuss birth control options.     She is currently condoms for birth control and would like to start oral contraceptive pill. She previously took Sheela.       Assessment   Problem List Items Addressed This Visit        Other    BCP (birth control pills) initiation - Primary     Women over the age of 35 are considered higher risk for blood clot on a birth control pill that contains  estrogen and progesterone, so I would like for you to start a progesterone only birth control pill.  Today I prescribed Micronor 0.35 mg tablets for you to begin taking after you return from your trip to [place].  Birth control pills to be effective, you should take these at the same time every single day.  If you miss a dose, you can take 2 pills the next day to make up for the missing doses.  If you miss 2 days, you can take 2 pills to make up for the missing 2 doses.  Not to miss any doses at all.  In order to prevent excessive menstrual bleeding on your trip, start Aygestin 5 mg daily when you began spotting. You can take this twice daily on the days when your menses are heavier if you are having any spotting.  This higher dose of progesterone can increase risk of blood clots and make sure you are moving around regularly and not sitting for long periods of time.         Relevant Medications    norethindrone (MICRONOR) 0.35 mg tablet    norethindrone (AYGESTIN) 5 mg tablet          PAST MEDICAL AND SURGICAL HISTORY        Past Medical History:   Diagnosis Date   • Chronic constipation 2/2/2022   • Iron deficiency 2/2/2022   • Lumbar radiculopathy 2/1/2022   • Mixed anxiety depressive disorder 2/1/2022   • Vitamin D deficiency 5/18/2022       No past surgical history on file.  MEDICATIONS          Current Outpatient Medications:   •  norethindrone (AYGESTIN) 5 mg tablet, Take 1 tablet (5 mg total) by mouth daily., Disp: 14 tablet, Rfl: 0  •  norethindrone (MICRONOR) 0.35 mg tablet, Take 1 tablet (0.35 mg total) by mouth daily., Disp: 28 tablet, Rfl: 11  •  sertraline (ZOLOFT) 50 mg tablet, Take 1 tablet (50 mg total) by mouth daily., Disp: 90 tablet, Rfl: 0  ALLERGIES        Thimerosal  FAMILY HISTORY        Family History   Problem Relation Age of Onset   • Diabetes Biological Mother    • Hypertension Biological Mother    • Stroke Biological Mother    • Alcohol abuse Biological Father      SOCIAL/ TOBACCO HISTORY         Social History     Tobacco Use   • Smoking status: Never   • Smokeless tobacco: Never   Substance Use Topics   • Alcohol use: Not Currently   • Drug use: Never     REVIEW OF SYSTEMS        Review of Systems   Constitutional: Negative.    HENT: Negative.    Eyes: Negative.    Respiratory: Negative.    Cardiovascular: Negative.    Gastrointestinal: Negative.    Endocrine: Negative.    Genitourinary: Negative.    Musculoskeletal: Negative.    Skin: Negative.    Allergic/Immunologic: Negative.    Neurological: Negative.    Hematological: Negative.    Psychiatric/Behavioral: Negative.       PHYSICAL EXAMINATION     There were no vitals taken for this visit.     Physical Exam  Constitutional:       General: She is awake.      Appearance: Normal appearance. She is well-groomed.   HENT:      Head: Normocephalic and atraumatic.      Right Ear: External ear normal.      Left Ear: External ear normal.      Nose: Nose normal.   Eyes:      General: Lids are normal.      Conjunctiva/sclera: Conjunctivae normal.   Neck:      Trachea: Phonation normal.   Pulmonary:      Effort: Pulmonary effort is normal.   Neurological:      General: No focal deficit present.      Mental Status: She is alert and oriented to person, place, and time. Mental status is at baseline.   Psychiatric:         Attention and Perception: Attention and perception normal.         Mood and Affect: Mood and affect normal.         Speech: Speech normal.         Behavior: Behavior normal. Behavior is cooperative.         Thought Content: Thought content normal.         Cognition and Memory: Cognition and memory normal.         Judgment: Judgment normal.        TIME   I spent 15 minutes on this date of service performing the following activities: obtaining history, performing examination, entering orders, documenting, preparing for visit and providing counseling and education.    Aspen Shine MD  1/11/2023

## 2023-01-11 NOTE — ASSESSMENT & PLAN NOTE
Women over the age of 35 are considered higher risk for blood clot on a birth control pill that contains estrogen and progesterone, so I would like for you to start a progesterone only birth control pill.  Today I prescribed Micronor 0.35 mg tablets for you to begin taking after you return from your trip to [place].  Birth control pills to be effective, you should take these at the same time every single day.  If you miss a dose, you can take 2 pills the next day to make up for the missing doses.  If you miss 2 days, you can take 2 pills to make up for the missing 2 doses.  Not to miss any doses at all.  In order to prevent excessive menstrual bleeding on your trip, start Aygestin 5 mg daily when you began spotting. You can take this twice daily on the days when your menses are heavier if you are having any spotting.  This higher dose of progesterone can increase risk of blood clots and make sure you are moving around regularly and not sitting for long periods of time.

## 2023-01-11 NOTE — PATIENT INSTRUCTIONS
Women over the age of 35 are considered higher risk for blood clot on a birth control pill that contains estrogen and progesterone, so I would like for you to start a progesterone only birth control pill.  Today I prescribed Micronor 0.35 mg tablets for you to begin taking after you return from your trip to [place].  Birth control pills to be effective, you should take these at the same time every single day.  If you miss a dose, you can take 2 pills the next day to make up for the missing doses.  If you miss 2 days, you can take 2 pills to make up for the missing 2 doses.  Not to miss any doses at all.  In order to prevent excessive menstrual bleeding on your trip, start Aygestin 5 mg daily when you began spotting. You can take this twice daily on the days when your menses are heavier if you are having any spotting.  This higher dose of progesterone can increase risk of blood clots and make sure you are moving around regularly and not sitting for long periods of time.

## 2023-02-02 DIAGNOSIS — Z30.011 BCP (BIRTH CONTROL PILLS) INITIATION: ICD-10-CM

## 2023-02-02 RX ORDER — NORETHINDRONE 0.35 MG/1
1 TABLET ORAL DAILY
Qty: 84 TABLET | Refills: 1 | OUTPATIENT
Start: 2023-02-02

## 2023-02-08 ENCOUNTER — DOCUMENTATION (OUTPATIENT)
Dept: PRIMARY CARE | Facility: CLINIC | Age: 38
End: 2023-02-08

## 2023-02-08 DIAGNOSIS — Z30.011 BCP (BIRTH CONTROL PILLS) INITIATION: ICD-10-CM

## 2023-02-08 RX ORDER — NORETHINDRONE 0.35 MG/1
1 TABLET ORAL DAILY
Qty: 84 TABLET | Refills: 3 | Status: SHIPPED | OUTPATIENT
Start: 2023-02-08 | End: 2023-03-22 | Stop reason: ALTCHOICE

## 2023-02-21 ENCOUNTER — TELEPHONE (OUTPATIENT)
Dept: ADMISSIONS | Facility: HOSPITAL | Age: 38
End: 2023-02-21
Payer: COMMERCIAL

## 2023-02-21 ENCOUNTER — TELEPHONE (OUTPATIENT)
Dept: PRIMARY CARE | Facility: CLINIC | Age: 38
End: 2023-02-21
Payer: COMMERCIAL

## 2023-02-21 RX ORDER — SERTRALINE HYDROCHLORIDE 50 MG/1
50 TABLET, FILM COATED ORAL DAILY
Qty: 90 TABLET | Refills: 0 | Status: SHIPPED | OUTPATIENT
Start: 2023-02-21 | End: 2023-02-23 | Stop reason: SDUPTHER

## 2023-02-21 NOTE — TELEPHONE ENCOUNTER
The patient called the office and left a message requesting a refill for her Zoloft medication and stated that her pharmacy informed her they could not request a refill.

## 2023-02-21 NOTE — TELEPHONE ENCOUNTER
Initial Intake    Linnette Rivera, a 37 y.o. female     General  Reason for seeking services (in client's own words)?: Pt being recommended to see therapist by her pcp.  Pt states she has hx of anxiety and depression.    Current Mental Health Symptoms  Current Symptoms: Depression; Anxiety  Depression: Decreased Energy; Decreased Sleep; Increased Sleep (lack of motivation)    Mental Health Treatment History  Prior Treatment Reported?: Yes  Type of Treatment: Outpatient    Outpatient  Details: pt has a therapist in the past none current    Medical  Extensive History: -- (endometrosis)  Medications: zoloft 50mg daily    Suicide Thoughts/Plans  Have you had suicidal thoughts in the past 72 hours?: No  Have you ever had suicidal thoughts?: Yes  Describe: passive si no plan or intent. 7 years ago  Do you have a plan?: No  Have you ever attempted?: No  Have you ever harmed yourself?: No  Do you have easy access to firearms?: No    Violence/Trauma  Do you currently have, or have you ever had, thoughts of harming someone else?: No  Any history of an event that you would consider emotionally/psychologically/physically traumatic?: no    Employment and Legal  Are you actively employed?: Yes  Are you presently or have you ever been a ? (Career or Volunteer): No  Do you now or have you in the past had any legal involvement?: No    Substance Use Details  Substance Use Includes: None      Eating Disorders  Do you have any problematic food related behaviors?: No  Have you ever been preoccupied with your looks or body image?: No    Additional Information  Determination: [facility] for Outpatient Therapy Evaluation

## 2023-02-23 ENCOUNTER — TELEPHONE (OUTPATIENT)
Dept: PRIMARY CARE | Facility: CLINIC | Age: 38
End: 2023-02-23
Payer: COMMERCIAL

## 2023-02-23 RX ORDER — SERTRALINE HYDROCHLORIDE 50 MG/1
50 TABLET, FILM COATED ORAL DAILY
Qty: 90 TABLET | Refills: 1 | Status: SHIPPED | OUTPATIENT
Start: 2023-02-23 | End: 2023-03-22 | Stop reason: SDUPTHER

## 2023-02-23 NOTE — TELEPHONE ENCOUNTER
Medication Request   Patient PCP: Aspen Shine MD  Next Office Visit: 3/22/2023  Has this provider prescribed this medication before?: Yes  Medication Name: sertraline (ZOLOFT  Medication Dose: 50 mg tablet  Medication Frequency: Take 1 tablet (50 mg total) by mouth daily  Preferred Pharmacy:   [facility] [id number] [place] [address], [address] [address]  Phone: [phone] Fax: [phone]      Please allow 2 business days for your provider to send your medication request or to reach out to discuss.

## 2023-03-15 ENCOUNTER — OFFICE VISIT (OUTPATIENT)
Dept: BEHAVIORAL HEALTH | Facility: CLINIC | Age: 38
End: 2023-03-15
Payer: COMMERCIAL

## 2023-03-15 DIAGNOSIS — F33.1 MODERATE EPISODE OF RECURRENT MAJOR DEPRESSIVE DISORDER (CMS/HCC): Primary | ICD-10-CM

## 2023-03-15 DIAGNOSIS — F41.1 GENERALIZED ANXIETY DISORDER: ICD-10-CM

## 2023-03-15 PROCEDURE — 90791 PSYCH DIAGNOSTIC EVALUATION: CPT | Performed by: PSYCHOLOGIST

## 2023-03-17 ASSESSMENT — COGNITIVE AND FUNCTIONAL STATUS - GENERAL
APPEARANCE: WELL GROOMED
PSYCHOMOTOR FUNCTIONING: WNL
AROUSAL LEVEL: ALERT
ORIENTATION: FULLY ORIENTED
SPEECH: REGULAR
EYE_CONTACT: WNL
MOOD: DEPRESSED;ANXIOUS
THOUGHT_CONTENT: APPROPRIATE
IMPULSE CONTROL: INTACT
INSIGHT: INTACT
AFFECT: TEARFUL

## 2023-03-17 NOTE — PROGRESS NOTES
[facility]- Outpatient Initial Visit    Visit Type Performed: In-office     Linnette Rivera presented today for a behavioral health visit.    Clinician confirmed identification of patient by name and birthdate.      Informed Consent/Confidentiality:  Patient was explained the model of mental healthcare we provide at [facility] ([facility]), including documentation visibility, the model of care, and confidentiality.  Model of care: This is a medium-intensity model of care, where we provide 12-20 visits of evidence-based psychotherapy. Patients always have the right to pursue other options for their behavioral healthcare (ie: longer-term outpatient psychotherapy, Intensive Outpatient Programs, Partial Hospitalization Programs, and Inpatient services).    Documentation: Psychologists and therapists (aka providers) of [facility] have access to see the progress notes. The visit diagnosis and appointment/scheduling information is visible to other providers who are listed as part of the patients' care team, to provide continuity of care across the care team, but they will not see the contents of the note. Patients have access to view their progress notes via [employer] (patient portal). Portal messages sent by patients are visible to providers and staff across [facility]. For portal communication with your [facility] provider, we recommend using the portal for non-clinical issues (ie: scheduling needs).     Confidentiality: Information shared by the patient with [facility] providers is kept confidential, and only discussed with their clinical supervisors. [facility] will only share information when patients make an informed written request (via Release of Information form) to have their information shared with a specific party. In rare circumstances, providers can be legally mandated by a 's court order to release information (this does not include subpoena's from attorneys).  Exceptions to confidentiality are made to ensure the safety of the patient or others (ie: to engage emergency services) if patients are in imminent risk of suicide or homicide. If child, elder, or other vulnerable persons abuse or neglect is reported, your healthcare providers must follow mandated reporting requirements.     Patient was given the opportunity to ask clarifying questions, and they expressed understanding and consent: Yes      SUBJECTIVE     History of Behavioral Health Treatment  Previous treatment: Previous therapy: yes  Previous psychiatric treatment and medication trials: previously on prozac  Depression screening was performed with standardized tool.  See below.   Previously experienced symptoms of: anxiety and depression  Psychotropic medication: Yes, currently prescribed zoloft 50mgOD      Substance Use History  ETOH/alcohol use: denied  Other substance or supplement use: drugs: denied.; tobacco: denied; caffeine: unspecified  Previous substance use treatment: N/A      Social History  Important people in pt's life/Support network: good relationship with spouse or significant other; reports good relationship with parents but indicates that she cannot share about everything with them.    Lives with: a spouse  Cultural practices/Islam/Spiritual beliefs pertinent to treatment: unspecified  Hobbies/Interests: painting; dogs  Additional pertinent historical information includes: Ph.D. in Economics; Working Full-Time, currently employed       Reported Symptoms  Depression symptoms: PHQ 9:  Little Interest or Pleasure in Doing Things: 3-->nearly every day    Feeling Down, Depressed or Hopeless: 2-->more than half the days    Trouble Falling or Staying Asleep, or Sleeping Too Much: 1-->several days    Feeling Tired or Having Little Energy: 3-->nearly every day    Poor Appetite or Overeatin-->not at all    Feeling Bad about Yourself - or that You are a Failure or Have Let Yourself or Your Family  Down: 3-->nearly every day    Trouble Concentrating on Things, Such as Reading the Newspaper or Watching Television: 2-->more than half the days    Moving or Speaking So Slowly that Other People Could Have Noticed? Or the Opposite - Being So Fidgety: 0-->not at all    Thoughts that You Would be Better Off Dead or of Hurting Yourself in Some Way: 2-->more than half the days    PHQ-9: Brief Depression Severity Measure Score: 16    If You Checked Off Any Problems, How Difficult Have These Problems Made It For You to Do Your Work, Take Care of Things at Home, or Get Along with Other People?: very difficult        Anxiety symptoms: GRACY-7  Feeling nervous, anxious or on edge: 3-->Nearly every day    Not being able to stop or control worrying: 3-->Nearly every day    Worrying too much about different things: 3-->Nearly every day    Trouble relaxing: 3-->Nearly every day    Being so restless that it is hard to sit still: 1-->Several days    Becoming easily annoyed or irritable: 3-->Nearly every day    Feeling afraid as if something awful might happen: 3-->Nearly every day      GAD7 Total Score: : 19      If you checked off any problems, how difficult have these made it for you to do your work, take care of things at home, or get along with other people?: Very difficult    Trauma Symptoms: none indicated  Temitope symptoms: denied  Additional reported symptoms: pain related to endometriosis      OBJECTIVE     Mental Status Exam  Appearance: Well Groomed  Speech: Regular  Psychomotor Functioning: WNL  Eye Contact: WNL  Orientation: Fully oriented  Thought Content: Appropriate  Insight: Intact  Affect: Tearful  Mood: Depressed, Anxious      ASSESSMENT     Psychotropic medications: no known adherence challenges, N/A   Current Outpatient Medications   Medication Sig Dispense Refill   • norethindrone (AYGESTIN) 5 mg tablet Take 1 tablet (5 mg total) by mouth daily. 14 tablet 0   • norethindrone (MICRONOR) 0.35 mg tablet Take 1  tablet (0.35 mg total) by mouth daily. 84 tablet 3   • sertraline (ZOLOFT) 50 mg tablet Take 1 tablet (50 mg total) by mouth daily. 90 tablet 1     No current facility-administered medications for this visit.       Suicidal Ideation/Homicidal Ideation Risk Assessment  Risk Factors: Presence of a Mood Disorder  Protective factors: Effective and accessible mental health care , Connectedness to individuals, family, community, and social institutions, Problem-solving and conflict resolution skills and Contacts with caregivers    Suicidal Ideation: Passive Ideation, No intention or plan.  Self Injurious Behavior:  Not Present  Homicidal Ideation: Not Present  Estimate of Current Risk: Minimal risk    Plan for Safety-   If pt develops passive SI or HI, they agreed to the following safety plan:    Warning signs: increasing isolation  Internal coping skills (things I can do on my own): painting  External coping skills (things that involve others): talk to   People I can call:   How I can make my environment safe: have a support person check in frequently  What makes life worth living: ; painting; career    If pt develops active SI or HI, pt agreed to use one of the following crisis resources:  [facility] Crisis Resources:   Mobile Crisis: 976.928.7125  Warm Lines: Peer Support Talk Line: 943.556.1911 (daily, 1pm-9pm)  Emergency Services: [phone]    General resources: call or text 988 (suicide and crisis lifeline), chat online via https://[employer]/, National Suicide Prevention Lifeline (1-656.375.6044) or text HOME to 848905, call 843 or go to the nearest ED or crisis center for an emergency psychiatric evaluation.                   Screening measures administered during this visit  PHQ-9: Brief Depression Severity Measure Score: 16  GAD7 Total Score: : 19      CLINICAL IMPRESSIONS  Linnette Rivera seems to be experiencing recurrent depression. She described last episode in 2016. She  reportedly recovered so well that she no longer took antidepressants. She also described therapy and self-help books as helpful during the last episode. She denied manic symptoms. PHQ and GRACY scores were significantly elevated. She will benefit from cognitive therapy interventions to help her manage her anxiety.   Severity: moderate, chronicity: acute and chronic, duration:6 months of presenting problem.  Prognostic protective factors include relationship support. Prognostic risk factors include history of depression.      PLAN     Goals:  Decrease depressive symptoms.   Identify automatic thoughts and core beliefs and respond to them effectively.     Recommendations for treatment: Individual Therapy, weekly    Recommendations for Interventions: Cognitive Behavior Therapy and Empathic Listening and Validation      Next visit plan  Continue to build rapport    I spent 60 minutes on this date of service performing the following activities: obtaining history, providing counseling and education and independently reviewing study/studies.

## 2023-03-22 ENCOUNTER — OFFICE VISIT (OUTPATIENT)
Dept: BEHAVIORAL HEALTH | Facility: CLINIC | Age: 38
End: 2023-03-22
Payer: COMMERCIAL

## 2023-03-22 ENCOUNTER — OFFICE VISIT (OUTPATIENT)
Dept: PRIMARY CARE | Facility: CLINIC | Age: 38
End: 2023-03-22
Payer: COMMERCIAL

## 2023-03-22 VITALS
HEIGHT: 60 IN | HEART RATE: 66 BPM | RESPIRATION RATE: 16 BRPM | TEMPERATURE: 97.6 F | SYSTOLIC BLOOD PRESSURE: 92 MMHG | DIASTOLIC BLOOD PRESSURE: 58 MMHG | BODY MASS INDEX: 20.81 KG/M2 | WEIGHT: 106 LBS | OXYGEN SATURATION: 99 %

## 2023-03-22 DIAGNOSIS — F33.1 MODERATE EPISODE OF RECURRENT MAJOR DEPRESSIVE DISORDER (CMS/HCC): Primary | ICD-10-CM

## 2023-03-22 DIAGNOSIS — F41.1 GENERALIZED ANXIETY DISORDER: ICD-10-CM

## 2023-03-22 DIAGNOSIS — F41.8 MIXED ANXIETY DEPRESSIVE DISORDER: Primary | Chronic | ICD-10-CM

## 2023-03-22 PROBLEM — Z30.011 BCP (BIRTH CONTROL PILLS) INITIATION: Status: RESOLVED | Noted: 2023-01-11 | Resolved: 2023-03-22

## 2023-03-22 PROCEDURE — 90834 PSYTX W PT 45 MINUTES: CPT | Performed by: PSYCHOLOGIST

## 2023-03-22 PROCEDURE — 99214 OFFICE O/P EST MOD 30 MIN: CPT | Performed by: STUDENT IN AN ORGANIZED HEALTH CARE EDUCATION/TRAINING PROGRAM

## 2023-03-22 PROCEDURE — 3008F BODY MASS INDEX DOCD: CPT | Performed by: STUDENT IN AN ORGANIZED HEALTH CARE EDUCATION/TRAINING PROGRAM

## 2023-03-22 RX ORDER — SERTRALINE HYDROCHLORIDE 50 MG/1
50 TABLET, FILM COATED ORAL DAILY
Qty: 90 TABLET | Refills: 3 | Status: SHIPPED | OUTPATIENT
Start: 2023-03-22 | End: 2023-04-03

## 2023-03-22 RX ORDER — SERTRALINE HYDROCHLORIDE 25 MG/1
25 TABLET, FILM COATED ORAL DAILY
Qty: 90 TABLET | Refills: 0 | Status: SHIPPED | OUTPATIENT
Start: 2023-03-22 | End: 2023-04-03

## 2023-03-22 RX ORDER — LEUPROLIDE ACETATE 3.75 MG
3.75 KIT INTRAMUSCULAR
COMMUNITY
Start: 2023-02-08 | End: 2024-04-03

## 2023-03-22 ASSESSMENT — ENCOUNTER SYMPTOMS
MUSCULOSKELETAL NEGATIVE: 1
HEMATOLOGIC/LYMPHATIC NEGATIVE: 1
EYES NEGATIVE: 1
GASTROINTESTINAL NEGATIVE: 1
NEUROLOGICAL NEGATIVE: 1
CONSTITUTIONAL NEGATIVE: 1
CARDIOVASCULAR NEGATIVE: 1
NERVOUS/ANXIOUS: 1
ALLERGIC/IMMUNOLOGIC NEGATIVE: 1
RESPIRATORY NEGATIVE: 1
ENDOCRINE NEGATIVE: 1
DYSPHORIC MOOD: 1

## 2023-03-22 NOTE — PATIENT INSTRUCTIONS
Given insufficient benefit of Zoloft 50mg, today we discusssed dose increase to 75mg daily, new Rx for 25mg tablet sent in to pharmacy for Linnette to combine with her current 50mg tablet.   Send me a My Chart Message in 2 weeks to let me know how you are doing.   Possible side effects include headache, nausea, diarrhea, dry mouth, increased sweating, feeling nervous, restless, fatigued, sleepy or having trouble sleeping (insomnia), these will often improve over the first week or two as you continue to take the medication.   I will send a message to Dr. Vieyra to let him know about this dose increase and ask him to continue to monitor your symptoms over time to see if you are improving or may benefit for an even higher dose.

## 2023-03-22 NOTE — PROGRESS NOTES
ESTABLISHED PATIENT OFFICE VISIT    [facility]   [facility] [place] [facility] [place] [facility] [place] M.D[facility]  [phone]      HPI - ASSESSMENT AND PLAN      CC:   Chief Complaint   Patient presents with   • Follow-up     Linnette Rivera is a 37 y.o. female with a history of lumbar back pain with radiculopathy, chronic constipation, iron deficiency, anxiety and depression who presents for follow up.     She had a good trip to [place] and did not get her period. She is not taking Micronor anymore after seeing Gyn - Endometriosis expert Dr. Carmelita Odom in February. She is now Lupron every month.     She feels like Zoloft 50mg is working well. She is seeing a therapist within [facility]- Brannon Vieyra. She does not want to change dose yet. She is not feeling well yet, but hopes she will be feel better once she has endometriosis surgery in May.       Assessment   Problem List Items Addressed This Visit        Mental Health    Mixed anxiety depressive disorder - Primary (Chronic)     Given insufficient benefit of Zoloft 50mg, today we discusssed dose increase to 75mg daily, new Rx for 25mg tablet sent in to pharmacy for Linnette to combine with her current 50mg tablet.   Send me a My Chart Message in 2 weeks to let me know how you are doing.   Possible side effects include headache, nausea, diarrhea, dry mouth, increased sweating, feeling nervous, restless, fatigued, sleepy or having trouble sleeping (insomnia), these will often improve over the first week or two as you continue to take the medication.   I will send a message to Dr. Vieyra to let him know about this dose increase and ask him to continue to monitor your symptoms over time to see if you are improving or may benefit for an even higher dose.            Relevant Medications    sertraline (ZOLOFT) 25 mg tablet    sertraline (ZOLOFT) 50 mg tablet          PAST MEDICAL AND SURGICAL HISTORY        Past Medical History:   Diagnosis Date   • Chronic constipation 2/2/2022    • Iron deficiency 2/2/2022   • Lumbar radiculopathy 2/1/2022   • Mixed anxiety depressive disorder 2/1/2022   • Vitamin D deficiency 5/18/2022       History reviewed. No pertinent surgical history.  MEDICATIONS          Current Outpatient Medications:   •  leuprolide (LUPRON DEPOT) 3.75 mg intramuscular injection, Inject 3.75 mg into the shoulder, thigh, or buttocks., Disp: , Rfl:   •  sertraline (ZOLOFT) 25 mg tablet, Take 1 tablet (25 mg total) by mouth daily., Disp: 90 tablet, Rfl: 0  •  sertraline (ZOLOFT) 50 mg tablet, Take 1 tablet (50 mg total) by mouth daily., Disp: 90 tablet, Rfl: 3  ALLERGIES        Thimerosal  FAMILY HISTORY        Family History   Problem Relation Age of Onset   • Diabetes Biological Mother    • Hypertension Biological Mother    • Stroke Biological Mother    • Alcohol abuse Biological Father      SOCIAL/ TOBACCO HISTORY        Social History     Tobacco Use   • Smoking status: Never   • Smokeless tobacco: Never   Substance Use Topics   • Alcohol use: Not Currently   • Drug use: Never     REVIEW OF SYSTEMS        Review of Systems   Constitutional: Negative.    HENT: Negative.    Eyes: Negative.    Respiratory: Negative.    Cardiovascular: Negative.    Gastrointestinal: Negative.    Endocrine: Negative.    Genitourinary: Negative.    Musculoskeletal: Negative.    Skin: Negative.    Allergic/Immunologic: Negative.    Neurological: Negative.    Hematological: Negative.    Psychiatric/Behavioral: Positive for dysphoric mood. The patient is nervous/anxious.       PHYSICAL EXAMINATION     Visit Vitals  BP (!) 92/58   Pulse 66   Temp 36.4 °C (97.6 °F)   Resp 16   Ht 1.524 m (5')   Wt 48.1 kg (106 lb)   SpO2 99%   BMI 20.70 kg/m²        Physical Exam  Constitutional:       General: She is awake.      Appearance: Normal appearance. She is well-groomed.   HENT:      Head: Normocephalic and atraumatic.   Eyes:      General: Lids are normal.      Extraocular Movements: Extraocular movements  intact.      Conjunctiva/sclera: Conjunctivae normal.   Neck:      Trachea: Phonation normal.   Pulmonary:      Effort: Pulmonary effort is normal.      Breath sounds: Normal breath sounds.   Skin:     General: Skin is warm and dry.   Neurological:      General: No focal deficit present.      Mental Status: She is alert and oriented to person, place, and time. Mental status is at baseline.   Psychiatric:         Attention and Perception: Attention and perception normal.         Mood and Affect: Mood and affect normal.         Speech: Speech normal.         Behavior: Behavior normal. Behavior is cooperative.         Thought Content: Thought content normal.         Cognition and Memory: Cognition and memory normal.         Judgment: Judgment normal.        TIME   I spent 15 minutes on this date of service performing the following activities: obtaining history, performing examination, entering orders, documenting, preparing for visit and providing counseling and education.    Aspen Shine MD  3/22/2023

## 2023-03-25 NOTE — PROGRESS NOTES
[facility] - Psychotherapy Follow-up Visit Note  Visit number: 2     Visit Type Performed: In-office     Linnette Rivera presented today for a behavioral health visit.          SUBJECTIVE     Symptoms  Depression symptoms: depressed mood and sadness  Anxiety symptoms: moderate anxiety/worry      OBJECTIVE     Mental Status Evaluation  Patient's mood and affect were consistent with the context, and consistent with their baseline: Yes   Comments:  calm and pleasant, awake and alert; oriented to person, place, and time    Additional Assessments completed this visit         Suicidal Ideation/Homicidal Ideation Risk Assessment: not assessed. If not assessed, reason:    Denied SI      Interventions  Acceptance and Commitment Therapy, Cognitive Behavior Therapy and Empathic Listening and Validation  Began to discuss automatic thoughts related to depression.     Psychotropic medications: no known adherence challenges, N/A   Current Outpatient Medications   Medication Sig Dispense Refill   • leuprolide (LUPRON DEPOT) 3.75 mg intramuscular injection Inject 3.75 mg into the shoulder, thigh, or buttocks.     • sertraline (ZOLOFT) 25 mg tablet Take 1 tablet (25 mg total) by mouth daily. 90 tablet 0   • sertraline (ZOLOFT) 50 mg tablet Take 1 tablet (50 mg total) by mouth daily. 90 tablet 3     No current facility-administered medications for this visit.       ASSESSMENT       Progress  Patient's progress toward their goals is generally unchanged (appeared less depressed; beginning to identify automatic thoughts related to her depression and anxiety. )   Patient's symptomology is unchanged Beginning to tackle automatic thoughts that contribute to depression.)       PLAN        Goals:  Decrease depressive symptoms.   Identify automatic thoughts and core beliefs and respond to them effectively.      Recommendations for treatment: Individual Therapy, weekly     Recommendations for Interventions: Cognitive Behavior  Therapy and Empathic Listening and Validation        Next visit plan  Continue to build rapport and address automatic thoughts related to depression     I spent 45 minutes on this date of service performing the following activities: obtaining history, providing counseling and education and independently reviewing study/studies.

## 2023-04-07 ENCOUNTER — TELEMEDICINE (OUTPATIENT)
Dept: BEHAVIORAL HEALTH | Facility: CLINIC | Age: 38
End: 2023-04-07
Payer: COMMERCIAL

## 2023-04-07 DIAGNOSIS — F41.1 GENERALIZED ANXIETY DISORDER: ICD-10-CM

## 2023-04-07 DIAGNOSIS — F33.1 MODERATE EPISODE OF RECURRENT MAJOR DEPRESSIVE DISORDER (CMS/HCC): Primary | ICD-10-CM

## 2023-04-07 PROCEDURE — 90834 PSYTX W PT 45 MINUTES: CPT | Mod: 95 | Performed by: PSYCHOLOGIST

## 2023-04-10 NOTE — PROGRESS NOTES
[facility] - Psychotherapy Follow-up Visit Note  Visit number: 3     Visit Type Performed: Video   Communication platform used for this encounter:  [employer] Video Visit (Epic Video Client)     Linnette Rivera was contacted today for a behavioral health visit.  Clinician confirmed identification of patient by name and birthdate, provider name, location of patient and clinician, and callback number in case disconnected.    Verification of Patient Location:  The patient affirms they are currently located in the following state: [place]  This visit was conducted via telehealth/telephone in lieu of an in-person visit due to precautions related to the COVID-19 pandemic.    Request for Consent:  You and I are about to have a tele-health check-in or visit. This is allowed because you are already a patient of our [facility] practice, and you have requested it.  This tele-health visit will be billed to your health insurance or you, if you are self-insured. You understand you will be responsible for any copayments or coinsurances that apply to your tele-health visit.  Before starting our telemedicine visit, I am required to get your consent for this virtual check-in or visit by tele-health . Do you consent?    Patient Response to Request for Consent: Yes      SUBJECTIVE     Symptoms  Depression symptoms: depressed mood and sadness  Anxiety symptoms: moderate anxiety/worry      OBJECTIVE     Mental Status Evaluation  Patient's mood and affect were consistent with the context, and consistent with their baseline: Yes   Comments:  calm and pleasant, awake and alert; oriented to person, place, and time    Additional Assessments completed this visit         Suicidal Ideation/Homicidal Ideation Risk Assessment: not assessed. 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 We identified that she would like her  to be more focused and driven as it will help her to feel less like all the responsibility for their financial future falls on her shoulders. We discussed how she can be more demonstrative at pointing this out to him.     Psychotropic medications: no known adherence challenges, N/A   Current Outpatient Medications   Medication Sig Dispense Refill   • leuprolide (LUPRON DEPOT) 3.75 mg intramuscular injection Inject 3.75 mg into the shoulder, thigh, or buttocks.     • sertraline (ZOLOFT) 100 mg tablet Take 1 tablet (100 mg total) by mouth daily. 90 tablet 0     No current facility-administered medications for this visit.       ASSESSMENT       Progress  Patient's progress toward their goals is generally unchanged (appears less depressed; identifies core belief that she has to be perfect as instrumental in getting her to this point in her career but ultimately unhelpful to her self-esteem. )   Patient's symptomology is unchanged Beginning to tackle automatic thoughts that contribute to depression.)       PLAN        Goals:  Decrease depressive symptoms.   Identify automatic thoughts and core beliefs and respond to them effectively.      Recommendations for treatment: Individual Therapy, weekly     Recommendations for Interventions: Cognitive Behavior Therapy and Empathic Listening and Validation        Next visit plan  Continue to build rapport and address the negative consequences of perfectionism     I spent 45 minutes on this date of service performing the following activities: obtaining history, providing counseling and education and independently reviewing study/studies.

## 2023-04-14 ENCOUNTER — TELEMEDICINE (OUTPATIENT)
Dept: BEHAVIORAL HEALTH | Facility: CLINIC | Age: 38
End: 2023-04-14
Payer: COMMERCIAL

## 2023-04-14 DIAGNOSIS — F41.1 GENERALIZED ANXIETY DISORDER: Primary | ICD-10-CM

## 2023-04-14 DIAGNOSIS — F33.1 MODERATE EPISODE OF RECURRENT MAJOR DEPRESSIVE DISORDER (CMS/HCC): ICD-10-CM

## 2023-04-14 PROCEDURE — 90834 PSYTX W PT 45 MINUTES: CPT | Mod: 95 | Performed by: PSYCHOLOGIST

## 2023-04-14 NOTE — PROGRESS NOTES
[facility] Behavioral Health Services - Psychotherapy Follow-up Visit Note  Visit number: 4    Visit Type Performed: Video   Communication platform used for this encounter:  [email or web address] Video Visit (Epic Video Client)     Linnette Rivera was contacted today for a behavioral health visit.  Clinician confirmed identification of patient by name and birthdate, provider name, location of patient and clinician, and callback number in case disconnected.    Verification of Patient Location:  The patient affirms they are currently located in the following state: [place]  This visit was conducted via telehealth/telephone in lieu of an in-person visit due to precautions related to the COVID-19 pandemic.    Request for Consent:  You and I are about to have a tele-health check-in or visit. This is allowed because you are already a patient of our [facility] practice, and you have requested it.  This tele-health visit will be billed to your health insurance or you, if you are self-insured. You understand you will be responsible for any copayments or coinsurances that apply to your tele-health visit.  Before starting our telemedicine visit, I am required to get your consent for this virtual check-in or visit by tele-health . Do you consent?    Patient Response to Request for Consent: Yes      SUBJECTIVE     Symptoms  Depression symptoms: depressed mood and sadness  Anxiety symptoms: moderate anxiety/worry      OBJECTIVE     Mental Status Evaluation  Patient's mood and affect were consistent with the context, and consistent with their baseline: Yes   Comments:  calm and pleasant, awake and alert; oriented to person, place, and time    Additional Assessments completed this visit         Suicidal Ideation/Homicidal Ideation Risk Assessment: not assessed. If not assessed, reason:    Denied SI      Interventions  Cognitive Behavior Therapy and Empathic Listening and Validation  Discussed her tendency to compare herself to others. We discussed helping  her get to know who she is outside of achievements, etc. We discussed the importance of choice with regard to what she decides to do and how hard she tries to push herself with her career, with her passion for painting. She discussed her perfectionism as stemming from her father's attitudes. She described coming from the a [employer] [place] in [place] [facility] [place] and building her career into what it is now.      Psychotropic medications: no known adherence challenges, N/A   Current Outpatient Medications   Medication Sig Dispense Refill   • leuprolide (LUPRON DEPOT) 3.75 mg intramuscular injection Inject 3.75 mg into the shoulder, thigh, or buttocks.     • sertraline (ZOLOFT) 100 mg tablet Take 1 tablet (100 mg total) by mouth daily. 90 tablet 0     No current facility-administered medications for this visit.       ASSESSMENT       Progress  Patient's progress toward their goals is generally improved somewhat. She is beginning to question her strong achievement orientation and tendency toward perfectionism. )   Patient's symptomology is unchanged Beginning to tackle automatic thoughts that contribute to depression. Today, she indicated that anxiety predates her depression.)       PLAN        Goals:  Decrease depressive symptoms.   Identify automatic thoughts and core beliefs and respond to them effectively.      Recommendations for treatment: Individual Therapy, weekly     Recommendations for Interventions: Cognitive Behavior Therapy and Empathic Listening and Validation        Next visit plan  Continue to build rapport and address the negative consequences of perfectionism     I spent 45 minutes on this date of service performing the following activities: obtaining history, providing counseling and education and independently reviewing study/studies.

## 2023-04-21 ENCOUNTER — TELEMEDICINE (OUTPATIENT)
Dept: BEHAVIORAL HEALTH | Facility: CLINIC | Age: 38
End: 2023-04-21
Payer: COMMERCIAL

## 2023-04-21 DIAGNOSIS — F33.1 MODERATE EPISODE OF RECURRENT MAJOR DEPRESSIVE DISORDER (CMS/HCC): ICD-10-CM

## 2023-04-21 DIAGNOSIS — F41.1 GENERALIZED ANXIETY DISORDER: Primary | ICD-10-CM

## 2023-04-21 PROCEDURE — 90834 PSYTX W PT 45 MINUTES: CPT | Mod: 95 | Performed by: PSYCHOLOGIST

## 2023-04-25 NOTE — PROGRESS NOTES
[facility] Behavioral Health Services - Psychotherapy Follow-up Visit Note  Visit number: 5    Visit Type Performed: Video   Communication platform used for this encounter:  [employer] Video Visit (Epic Video Client)     Linnette Rivera was contacted today for a behavioral health visit.  Clinician confirmed identification of patient by name and birthdate, provider name, location of patient and clinician, and callback number in case disconnected.    Verification of Patient Location:  The patient affirms they are currently located in the following state: [place]  This visit was conducted via telehealth/telephone in lieu of an in-person visit due to precautions related to the COVID-19 pandemic.    Request for Consent:  You and I are about to have a tele-health check-in or visit. This is allowed because you are already a patient of our [facility] practice, and you have requested it.  This tele-health visit will be billed to your health insurance or you, if you are self-insured. You understand you will be responsible for any copayments or coinsurances that apply to your tele-health visit.  Before starting our telemedicine visit, I am required to get your consent for this virtual check-in or visit by tele-health . Do you consent?    Patient Response to Request for Consent: Yes      SUBJECTIVE     Symptoms  Depression symptoms: depressed mood, anhedonia, fatigue/lack of energy, feelings of worthlessness/guilt and difficulty concentrating or making decisions  Anxiety symptoms: moderate anxiety/worry      OBJECTIVE     Mental Status Evaluation  Patient's mood and affect were consistent with the context, and consistent with their baseline: Yes   Comments:  calm and pleasant, awake and alert; oriented to person, place, and time    Additional Assessments completed this visit  PHQ-9: Brief Depression Severity Measure Score: 10  GAD7 Total Score: : 15      Suicidal Ideation/Homicidal Ideation Risk Assessment: not assessed. 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 Discussed cognitive restructuring of beliefs about self and perfection.     Psychotropic medications: no known adherence challenges, N/A   Current Outpatient Medications   Medication Sig Dispense Refill   • leuprolide (LUPRON DEPOT) 3.75 mg intramuscular injection Inject 3.75 mg into the shoulder, thigh, or buttocks.     • sertraline (ZOLOFT) 100 mg tablet Take 1 tablet (100 mg total) by mouth daily. 90 tablet 0     No current facility-administered medications for this visit.       ASSESSMENT       Progress  Patient's progress toward their goals is showing improvement. She is beginning to challenge previously held beliefs about herself particularly with regard to perfectionistic attitudes.    Patient's symptomology is gradually improving. She is beginning to tackle automatic thoughts that contribute to depression. Falling short of perfectionism contributes to ongoing feelings of sadness and worthlessness.)       PLAN        Goals:  Decrease depressive symptoms.   Identify automatic thoughts and core beliefs and respond to them effectively.      Recommendations for treatment: Individual Therapy, weekly     Recommendations for Interventions: Cognitive Behavior Therapy and Empathic Listening and Validation        Next visit plan  Utilize thought records to explore perfectionistic beliefs.      I spent 45 minutes on this date of service performing the following activities: obtaining history, providing counseling and education and independently reviewing study/studies.   [place]

## 2023-04-28 ENCOUNTER — TELEMEDICINE (OUTPATIENT)
Dept: BEHAVIORAL HEALTH | Facility: CLINIC | Age: 38
End: 2023-04-28
Payer: COMMERCIAL

## 2023-04-28 DIAGNOSIS — F41.1 GENERALIZED ANXIETY DISORDER: Primary | ICD-10-CM

## 2023-04-28 DIAGNOSIS — F33.1 MODERATE EPISODE OF RECURRENT MAJOR DEPRESSIVE DISORDER (CMS/HCC): ICD-10-CM

## 2023-04-28 PROCEDURE — 90834 PSYTX W PT 45 MINUTES: CPT | Mod: 95 | Performed by: PSYCHOLOGIST

## 2023-05-01 NOTE — PROGRESS NOTES
[facility] Behavioral Health Services - Psychotherapy Follow-up Visit Note  Visit number: 6    Visit Type Performed: Video   Communication platform used for this encounter:  [employer] Video Visit (Epic Video Client)     Linnette Rivera was contacted today for a behavioral health visit.  Clinician confirmed identification of patient by name and birthdate, provider name, location of patient and clinician, and callback number in case disconnected.    Verification of Patient Location:  The patient affirms they are currently located in the following state: [place]  This visit was conducted via telehealth/telephone in lieu of an in-person visit due to precautions related to the COVID-19 pandemic.    Request for Consent:  You and I are about to have a tele-health check-in or visit. This is allowed because you are already a patient of our [facility] practice, and you have requested it.  This tele-health visit will be billed to your health insurance or you, if you are self-insured. You understand you will be responsible for any copayments or coinsurances that apply to your tele-health visit.  Before starting our telemedicine visit, I am required to get your consent for this virtual check-in or visit by tele-health . Do you consent?    Patient Response to Request for Consent: Yes      SUBJECTIVE     Symptoms  Depression symptoms: fatigue/lack of energy and feelings of worthlessness/guilt  Anxiety symptoms: moderate anxiety/worry      OBJECTIVE     Mental Status Evaluation  Patient's mood and affect were consistent with the context, and consistent with their baseline: Yes   Comments:  calm and pleasant, awake and alert; oriented to person, place, and time    Additional Assessments completed this visit         Suicidal Ideation/Homicidal Ideation Risk Assessment: not assessed. If not assessed, reason:    Denied SI      Interventions  Cognitive Behavior Therapy and Empathic Listening and Validation  Discussed chronic anxiety related to  perfectionism. She indicated that she was planning a party with her  for several friends. She described having a high standard for these parties. We discussed converting her mindset to on one of experience instead of performance.     Psychotropic medications: no known adherence challenges, N/A   Current Outpatient Medications   Medication Sig Dispense Refill   • leuprolide (LUPRON DEPOT) 3.75 mg intramuscular injection Inject 3.75 mg into the shoulder, thigh, or buttocks.     • sertraline (ZOLOFT) 100 mg tablet Take 1 tablet (100 mg total) by mouth daily. 90 tablet 3   • sertraline (ZOLOFT) 25 mg tablet Take 1 tablet (25 mg total) by mouth daily. 90 tablet 3     No current facility-administered medications for this visit.       ASSESSMENT       Progress  Patient's progress toward their goals is showing improvement. She is beginning to challenge previously held beliefs about herself particularly with regard to perfectionistic attitudes.    Patient's symptomology is gradually improving. Appears less depressed, dysphoric.        PLAN        Goals:  Decrease depressive symptoms.   Identify automatic thoughts and core beliefs and respond to them effectively.      Recommendations for treatment: Individual Therapy, weekly     Recommendations for Interventions: Cognitive Behavior Therapy and Empathic Listening and Validation        Next visit plan  Continue to discuss ways in which she can focus on the experience aspect of various tasks at work, in art studio, hosting guests.      I spent 45 minutes on this date of service performing the following activities: obtaining history, providing counseling and education and independently reviewing study/studies.

## 2023-05-08 ENCOUNTER — TELEMEDICINE (OUTPATIENT)
Dept: BEHAVIORAL HEALTH | Facility: CLINIC | Age: 38
End: 2023-05-08
Payer: COMMERCIAL

## 2023-05-08 DIAGNOSIS — F33.1 MODERATE EPISODE OF RECURRENT MAJOR DEPRESSIVE DISORDER (CMS/HCC): ICD-10-CM

## 2023-05-08 DIAGNOSIS — F41.1 GENERALIZED ANXIETY DISORDER: Primary | ICD-10-CM

## 2023-05-08 PROCEDURE — 90834 PSYTX W PT 45 MINUTES: CPT | Mod: 95 | Performed by: PSYCHOLOGIST

## 2023-05-09 NOTE — PROGRESS NOTES
[facility] Behavioral Health Services - Psychotherapy Follow-up Visit Note  Visit number: 7    Visit Type Performed: Video   Communication platform used for this encounter:  [employer] Video Visit (Epic Video Client)     Linnette Rivera was contacted today for a behavioral health visit.  Clinician confirmed identification of patient by name and birthdate, provider name, location of patient and clinician, and callback number in case disconnected.    Verification of Patient Location:  The patient affirms they are currently located in the following state: [place]  This visit was conducted via telehealth/telephone in lieu of an in-person visit due to precautions related to the COVID-19 pandemic.    Request for Consent:  You and I are about to have a tele-health check-in or visit. This is allowed because you are already a patient of our [facility] practice, and you have requested it.  This tele-health visit will be billed to your health insurance or you, if you are self-insured. You understand you will be responsible for any copayments or coinsurances that apply to your tele-health visit.  Before starting our telemedicine visit, I am required to get your consent for this virtual check-in or visit by tele-health . Do you consent?    Patient Response to Request for Consent: Yes      SUBJECTIVE     Symptoms  Depression symptoms: none reported  Anxiety symptoms: mild anxiety/worry      OBJECTIVE     Mental Status Evaluation  Patient's mood and affect were consistent with the context, and consistent with their baseline: Yes   Comments:  calm and pleasant, awake and alert; oriented to person, place, and time    Additional Assessments completed this visit         Suicidal Ideation/Homicidal Ideation Risk Assessment: not assessed. If not assessed, reason:    Denied SI      Interventions  Empathic Listening and Validation  Discussed improvements in mood.     Psychotropic medications: no known adherence challenges, N/A   Current Outpatient  Medications   Medication Sig Dispense Refill   • leuprolide (LUPRON DEPOT) 3.75 mg intramuscular injection Inject 3.75 mg into the shoulder, thigh, or buttocks.     • sertraline (ZOLOFT) 100 mg tablet Take 1 tablet (100 mg total) by mouth daily. 90 tablet 3   • sertraline (ZOLOFT) 25 mg tablet Take 1 tablet (25 mg total) by mouth daily. 90 tablet 3     No current facility-administered medications for this visit.       ASSESSMENT       Progress  Patient continues to show improvement. She denied worsening mood or anxiety. She reports and seems to be struggling with perfectionism much less.   Patient's symptomology is gradually improving.  Affect brighter, reports less depression.        PLAN        Goals:  Decrease depressive symptoms.   Identify automatic thoughts and core beliefs and respond to them effectively.      Recommendations for treatment: Individual Therapy, weekly     Recommendations for Interventions: Cognitive Behavior Therapy and Empathic Listening and Validation        Next visit plan  Discuss moving from perfectionism to assessment of experience of doing things at work, home, socially.   I spent 45 minutes on this date of service performing the following activities: obtaining history, providing counseling and education and independently reviewing study/studies.

## 2023-06-09 ENCOUNTER — TELEMEDICINE (OUTPATIENT)
Dept: BEHAVIORAL HEALTH | Facility: CLINIC | Age: 38
End: 2023-06-09
Payer: COMMERCIAL

## 2023-06-09 DIAGNOSIS — F41.1 GENERALIZED ANXIETY DISORDER: Primary | ICD-10-CM

## 2023-06-09 DIAGNOSIS — F33.1 MODERATE EPISODE OF RECURRENT MAJOR DEPRESSIVE DISORDER (CMS/HCC): ICD-10-CM

## 2023-06-09 PROCEDURE — 90834 PSYTX W PT 45 MINUTES: CPT | Mod: 95 | Performed by: PSYCHOLOGIST

## 2023-06-10 NOTE — PROGRESS NOTES
[facility] Behavioral Health Services - Psychotherapy Follow-up Visit Note  Visit number: 8    Visit Type Performed: Video   Communication platform used for this encounter:  [employer] Video Visit (Epic Video Client)     Linnette Rivera was contacted today for a behavioral health visit.  Clinician confirmed identification of patient by name and birthdate, provider name, location of patient and clinician, and callback number in case disconnected.    Verification of Patient Location:  The patient affirms they are currently located in the following state: [place]  This visit was conducted via telehealth/telephone in lieu of an in-person visit due to precautions related to the COVID-19 pandemic.    Request for Consent:  You and I are about to have a tele-health check-in or visit. This is allowed because you are already a patient of our [facility] practice, and you have requested it.  This tele-health visit will be billed to your health insurance or you, if you are self-insured. You understand you will be responsible for any copayments or coinsurances that apply to your tele-health visit.  Before starting our telemedicine visit, I am required to get your consent for this virtual check-in or visit by tele-health . Do you consent?    Patient Response to Request for Consent: Yes      SUBJECTIVE     Symptoms  Depression symptoms: none reported  Anxiety symptoms: mild anxiety/worry      OBJECTIVE     Mental Status Evaluation  Patient's mood and affect were consistent with the context, and consistent with their baseline: Yes   Comments:  calm and pleasant, awake and alert; oriented to person, place, and time    Additional Assessments completed this visit         Suicidal Ideation/Homicidal Ideation Risk Assessment: not assessed. If not assessed, reason:    Denied SI      Interventions  Empathic Listening and Validation  We reviewed what pt has been doing since the time of the last visit. She indicated that she went on a business trip to .  She is beginning to look at her own thoughts and her own struggle for perfection at work in a different light. She is reportedly becoming somewhat better at not needing to achieve at the level she was trying for previously and is becoming increasingly okay about this.     Psychotropic medications: no known adherence challenges, N/A   Current Outpatient Medications   Medication Sig Dispense Refill   • leuprolide (LUPRON DEPOT) 3.75 mg intramuscular injection Inject 3.75 mg into the shoulder, thigh, or buttocks.     • sertraline (ZOLOFT) 100 mg tablet Take 1 tablet (100 mg total) by mouth daily. 90 tablet 3   • sertraline (ZOLOFT) 25 mg tablet Take 1 tablet (25 mg total) by mouth daily. 90 tablet 3     No current facility-administered medications for this visit.       ASSESSMENT       Progress  Patient continues to show improvement. She is becoming increasingly forgiving toward herself.   Patient's symptomology is gradually improving. Appears more relieved; less frustrated       PLAN        Goals:  Decrease depressive symptoms (partially achieved)  Identify automatic thoughts and core beliefs and respond to them effectively.      Recommendations for treatment: Individual Therapy, weekly     Recommendations for Interventions: Cognitive Behavior Therapy and Empathic Listening and Validation        Next visit plan  Resume conversation about relationships that no longer serve her and how to handle these.     I spent 45 minutes on this date of service performing the following activities: obtaining history, providing counseling and education and independently reviewing study/studies.

## 2023-06-14 ENCOUNTER — TELEMEDICINE (OUTPATIENT)
Dept: BEHAVIORAL HEALTH | Facility: CLINIC | Age: 38
End: 2023-06-14
Payer: COMMERCIAL

## 2023-06-14 DIAGNOSIS — F33.1 MODERATE EPISODE OF RECURRENT MAJOR DEPRESSIVE DISORDER (CMS/HCC): ICD-10-CM

## 2023-06-14 DIAGNOSIS — F41.1 GENERALIZED ANXIETY DISORDER: Primary | ICD-10-CM

## 2023-06-14 PROCEDURE — 90834 PSYTX W PT 45 MINUTES: CPT | Mod: 95 | Performed by: PSYCHOLOGIST

## 2023-06-15 NOTE — PROGRESS NOTES
[facility] Behavioral Health Services - Psychotherapy Follow-up Visit Note  Visit number: 9    Visit Type Performed: Video   Communication platform used for this encounter:  [employer] Video Visit (Epic Video Client)     Linnette Rivera was contacted today for a behavioral health visit.  Clinician confirmed identification of patient by name and birthdate, provider name, location of patient and clinician, and callback number in case disconnected.    Verification of Patient Location:  The patient affirms they are currently located in the following state: [place]  This visit was conducted via telehealth/telephone in lieu of an in-person visit due to precautions related to the COVID-19 pandemic.    Request for Consent:  You and I are about to have a tele-health check-in or visit. This is allowed because you are already a patient of our [facility] practice, and you have requested it.  This tele-health visit will be billed to your health insurance or you, if you are self-insured. You understand you will be responsible for any copayments or coinsurances that apply to your tele-health visit.  Before starting our telemedicine visit, I am required to get your consent for this virtual check-in or visit by tele-health . Do you consent?    Patient Response to Request for Consent: Yes      SUBJECTIVE     Symptoms  Depression symptoms: tends to feel incomplete without external validation  Anxiety symptoms: mild anxiety/worry      OBJECTIVE     Mental Status Evaluation  Patient's mood and affect were consistent with the context, and consistent with their baseline: Yes   Comments:  calm and pleasant, awake and alert; oriented to person, place, and time    Additional Assessments completed this visit         Suicidal Ideation/Homicidal Ideation Risk Assessment: not assessed. If not assessed, reason:    Denied SI      Interventions  Empathic Listening and Validation  We discussed tendency to need external validation. We discussed beginning to  identify how to feel more satisfied on her own without this validation.     Psychotropic medications: no known adherence challenges, N/A   Current Outpatient Medications   Medication Sig Dispense Refill   • leuprolide (LUPRON DEPOT) 3.75 mg intramuscular injection Inject 3.75 mg into the shoulder, thigh, or buttocks.     • sertraline (ZOLOFT) 100 mg tablet Take 1 tablet (100 mg total) by mouth daily. 90 tablet 3   • sertraline (ZOLOFT) 25 mg tablet Take 1 tablet (25 mg total) by mouth daily. 90 tablet 3     No current facility-administered medications for this visit.       ASSESSMENT       Progress  Patient continues to show improvement and is moving into depth work regarding personality pattern that perpetuates depressive symptoms.   Patient's symptomology is gradually improving. Appears more relieved; less frustrated       PLAN        Goals:  Decrease depressive symptoms (partially achieved)  Identify automatic thoughts and core beliefs and respond to them effectively.      Recommendations for treatment: Individual Therapy, weekly     Recommendations for Interventions: Cognitive Behavior Therapy and Empathic Listening and Validation        Next visit plan.   Discuss how to refocus to the experience of things she does as opposed to previously focusing on how well she does things.   I spent 45 minutes on this date of service performing the following activities: obtaining history, providing counseling and education and independently reviewing study/studies.

## 2023-06-30 ENCOUNTER — TELEMEDICINE (OUTPATIENT)
Dept: BEHAVIORAL HEALTH | Facility: CLINIC | Age: 38
End: 2023-06-30
Payer: COMMERCIAL

## 2023-06-30 DIAGNOSIS — F33.1 MODERATE EPISODE OF RECURRENT MAJOR DEPRESSIVE DISORDER (CMS/HCC): ICD-10-CM

## 2023-06-30 DIAGNOSIS — F41.1 GENERALIZED ANXIETY DISORDER: Primary | ICD-10-CM

## 2023-06-30 PROCEDURE — 90834 PSYTX W PT 45 MINUTES: CPT | Mod: 95 | Performed by: PSYCHOLOGIST

## 2023-06-30 NOTE — PROGRESS NOTES
[facility] Behavioral Health Services - Psychotherapy Follow-up Visit Note  Visit number: 10    Visit Type Performed: Video   Communication platform used for this encounter:  CircleUphart Video Visit (Epic Video Client)     Linnette Rivera was contacted today for a behavioral health visit.  Clinician confirmed identification of patient by name and birthdate, provider name, location of patient and clinician, and callback number in case disconnected.    Verification of Patient Location:  The patient affirms they are currently located in the following state: [place]  This visit was conducted via telehealth/telephone in lieu of an in-person visit due to precautions related to the COVID-19 pandemic.    Request for Consent:  You and I are about to have a tele-health check-in or visit. This is allowed because you are already a patient of our [facility] practice, and you have requested it.  This tele-health visit will be billed to your health insurance or you, if you are self-insured. You understand you will be responsible for any copayments or coinsurances that apply to your tele-health visit.  Before starting our telemedicine visit, I am required to get your consent for this virtual check-in or visit by tele-health . Do you consent?    Patient Response to Request for Consent: Yes      SUBJECTIVE     Symptoms  Depression symptoms: tends to feel incomplete without external validation  Anxiety symptoms: mild anxiety/worry      OBJECTIVE     Mental Status Evaluation  Patient's mood and affect were consistent with the context, and consistent with their baseline: Yes   Comments:  calm and pleasant, awake and alert; oriented to person, place, and time    Additional Assessments completed this visit         Suicidal Ideation/Homicidal Ideation Risk Assessment: not assessed. If not assessed, reason:    Denied SI      Interventions  Empathic Listening and Validation  We discussed creating more structure in her life which could lead to improvement  in sleep and associated improvement in mood.     Psychotropic medications: no known adherence challenges, N/A   Current Outpatient Medications   Medication Sig Dispense Refill   • leuprolide (LUPRON DEPOT) 3.75 mg intramuscular injection Inject 3.75 mg into the shoulder, thigh, or buttocks.     • sertraline (ZOLOFT) 100 mg tablet Take 1 tablet (100 mg total) by mouth daily. 90 tablet 3   • sertraline (ZOLOFT) 25 mg tablet Take 1 tablet (25 mg total) by mouth daily. 90 tablet 3     No current facility-administered medications for this visit.       ASSESSMENT       Progress  Patient experienced more of an exacerbation recently. We discussed the need for increased structure to aid in keeping her from experiencing worsening depression. Her sleep is somewhat disordered currently and this seems to have contributed to the exacerbation.        PLAN        Goals:  Decrease depressive symptoms (partially achieved)  Identify automatic thoughts and core beliefs and respond to them effectively.      Recommendations for treatment: Individual Therapy, weekly     Recommendations for Interventions: Cognitive Behavior Therapy and Empathic Listening and Validation        Next visit plan.   Discuss plan to approximate a normal sleep schedule.     I spent 45 minutes on this date of service performing the following activities: obtaining history, providing counseling and education and independently reviewing study/studies.

## 2023-07-14 ENCOUNTER — TELEMEDICINE (OUTPATIENT)
Dept: BEHAVIORAL HEALTH | Facility: CLINIC | Age: 38
End: 2023-07-14
Payer: COMMERCIAL

## 2023-07-14 DIAGNOSIS — F33.1 MODERATE EPISODE OF RECURRENT MAJOR DEPRESSIVE DISORDER (CMS/HCC): ICD-10-CM

## 2023-07-14 DIAGNOSIS — F41.1 GENERALIZED ANXIETY DISORDER: Primary | ICD-10-CM

## 2023-07-14 PROCEDURE — 90834 PSYTX W PT 45 MINUTES: CPT | Mod: 95 | Performed by: PSYCHOLOGIST

## 2023-07-17 NOTE — PROGRESS NOTES
[facility] Behavioral Health Services - Psychotherapy Follow-up Visit Note  Visit number: 11    Visit Type Performed: Video   Communication platform used for this encounter:  [email or web address] Video Visit (Epic Video Client)     Linnette Rivera was contacted today for a behavioral health visit.  Clinician confirmed identification of patient by name and birthdate, provider name, location of patient and clinician, and callback number in case disconnected.    Verification of Patient Location:  The patient affirms they are currently located in the following state: [place]  This visit was conducted via telehealth/telephone in lieu of an in-person visit due to precautions related to the COVID-19 pandemic.    Request for Consent:  You and I are about to have a tele-health check-in or visit. This is allowed because you are already a patient of our [facility] practice, and you have requested it.  This tele-health visit will be billed to your health insurance or you, if you are self-insured. You understand you will be responsible for any copayments or coinsurances that apply to your tele-health visit.  Before starting our telemedicine visit, I am required to get your consent for this virtual check-in or visit by tele-health . Do you consent?    Patient Response to Request for Consent: Yes      SUBJECTIVE     Symptoms  Depression symptoms: sleep difficulty  Anxiety symptoms: mild anxiety/worry      OBJECTIVE     Mental Status Evaluation  Patient's mood and affect were consistent with the context, and consistent with their baseline: Yes   Comments:  calm and pleasant, awake and alert; oriented to person, place, and time    Additional Assessments completed this visit       Suicidal Ideation/Homicidal Ideation Risk Assessment: not assessed. If not assessed, reason:    Denied SI    Interventions  Empathic Listening and Validation  We discussed her efforts to stay on stricter schedule to alleviate worsening of mood and depressive symptoms. She  indicated that their dog sometimes interferes with sleep schedule. We discussed her doing a novel task at work and how this contributed to job enjoyment. We discussed the level of her various relationship and managing expectations regarding this.     Psychotropic medications: no known adherence challenges, N/A   Current Outpatient Medications   Medication Sig Dispense Refill   • leuprolide (LUPRON DEPOT) 3.75 mg intramuscular injection Inject 3.75 mg into the shoulder, thigh, or buttocks.     • sertraline (ZOLOFT) 100 mg tablet Take 1 tablet (100 mg total) by mouth daily. 90 tablet 3   • sertraline (ZOLOFT) 25 mg tablet Take 1 tablet (25 mg total) by mouth daily. 90 tablet 3     No current facility-administered medications for this visit.       ASSESSMENT       Progress  Mood appears stable. We are focusing on managing expectations for relationships which is affected by her perfectionism.     PLAN        Goals:  Decrease depressive symptoms (partially achieved)  Identify automatic thoughts and core beliefs and respond to them effectively.      Recommendations for treatment: Individual Therapy, weekly     Recommendations for Interventions: Cognitive Behavior Therapy and Empathic Listening and Validation        Next visit plan.   Continue to identify ways to normalize sleep schedule; discuss managing expectations in relationships.     I spent 45 minutes on this date of service performing the following activities: obtaining history, providing counseling and education and independently reviewing study/studies.

## 2023-07-28 ENCOUNTER — TELEMEDICINE (OUTPATIENT)
Dept: BEHAVIORAL HEALTH | Facility: CLINIC | Age: 38
End: 2023-07-28
Payer: COMMERCIAL

## 2023-07-28 DIAGNOSIS — F41.1 GENERALIZED ANXIETY DISORDER: Primary | ICD-10-CM

## 2023-07-28 DIAGNOSIS — F33.1 MODERATE EPISODE OF RECURRENT MAJOR DEPRESSIVE DISORDER (CMS/HCC): ICD-10-CM

## 2023-07-28 PROCEDURE — 90834 PSYTX W PT 45 MINUTES: CPT | Mod: 95 | Performed by: PSYCHOLOGIST

## 2023-07-30 NOTE — PROGRESS NOTES
[facility] Behavioral Health Services - Psychotherapy Follow-up Visit Note  Visit number: 12    Visit Type Performed: Video   Communication platform used for this encounter:  BioProtectt Video Visit (Epic Video Client)     Linnette Rivera was contacted today for a behavioral health visit.  Clinician confirmed identification of patient by name and birthdate, provider name, location of patient and clinician, and callback number in case disconnected.    Verification of Patient Location:  The patient affirms they are currently located in the following state: [place]  This visit was conducted via telehealth/telephone in lieu of an in-person visit due to precautions related to the COVID-19 pandemic.    Request for Consent:  You and I are about to have a tele-health check-in or visit. This is allowed because you are already a patient of our [facility] practice, and you have requested it.  This tele-health visit will be billed to your health insurance or you, if you are self-insured. You understand you will be responsible for any copayments or coinsurances that apply to your tele-health visit.  Before starting our telemedicine visit, I am required to get your consent for this virtual check-in or visit by tele-health . Do you consent?    Patient Response to Request for Consent: Yes      SUBJECTIVE     Symptoms  Depression symptoms: sleep difficulty  Anxiety symptoms: mild anxiety/worry      OBJECTIVE     Mental Status Evaluation  Patient's mood and affect were consistent with the context, and consistent with their baseline: Yes   Comments:  calm and pleasant, awake and alert; oriented to person, place, and time    Additional Assessments completed this visit       Suicidal Ideation/Homicidal Ideation Risk Assessment: not assessed. If not assessed, reason:    Denied SI    Interventions  Empathic Listening and Validation  Patient discussed recent presentation in front of the  of her company at their headquarters in [place]. She discussed what  led up to this including an argument/disagreement with her immediate boss. We discussed accepting this process now that it was complete. And we discussed the praise she received from everyone following the presentation, including her immediate supervisor who reportedly tried to co-opt her presentation.     Psychotropic medications: no known adherence challenges, N/A   Current Outpatient Medications   Medication Sig Dispense Refill   • leuprolide (LUPRON DEPOT) 3.75 mg intramuscular injection Inject 3.75 mg into the shoulder, thigh, or buttocks.     • sertraline (ZOLOFT) 100 mg tablet Take 1 tablet (100 mg total) by mouth daily. 90 tablet 3   • sertraline (ZOLOFT) 25 mg tablet Take 1 tablet (25 mg total) by mouth daily. 90 tablet 3     No current facility-administered medications for this visit.       ASSESSMENT       Progress  Patient appears to be more balanced emotionally. She continues to deal with issues of perfectionism but there is less observed struggle.     PLAN        Goals:  Decrease depressive symptoms (partially achieved)  Identify automatic thoughts and core beliefs and respond to them effectively.      Recommendations for treatment: Individual Therapy, weekly     Recommendations for Interventions: Cognitive Behavior Therapy and Empathic Listening and Validation        Next visit plan.   Continue to provide emotional support and discuss continuing structure, importance of sleep, and setting realistic expectations for herself.    I spent 45 minutes on this date of service performing the following activities: obtaining history, providing counseling and education and independently reviewing study/studies.

## 2023-08-14 ENCOUNTER — TELEPHONE (OUTPATIENT)
Dept: PRIMARY CARE | Facility: CLINIC | Age: 38
End: 2023-08-14
Payer: COMMERCIAL

## 2023-08-14 RX ORDER — SERTRALINE HYDROCHLORIDE 25 MG/1
25 TABLET, FILM COATED ORAL DAILY
Qty: 90 TABLET | Refills: 0 | Status: SHIPPED | OUTPATIENT
Start: 2023-08-14 | End: 2023-09-11

## 2023-08-14 RX ORDER — SERTRALINE HYDROCHLORIDE 100 MG/1
100 TABLET, FILM COATED ORAL DAILY
Qty: 90 TABLET | Refills: 0 | Status: SHIPPED | OUTPATIENT
Start: 2023-08-14 | End: 2023-09-11 | Stop reason: SDUPTHER

## 2023-08-14 NOTE — TELEPHONE ENCOUNTER
Patient has NPV with Dr. Ayala 10/4/2023 but would like medication refilled before her appointment.        Medication Request   Patient PCP: Aspen Shine MD  Next Office Visit: 10/4/2023  Has this provider prescribed this medication before?: Yes  Medication Name: sertraline (Zoloft)  Medication Dose: 100 mg  Medication Frequency: Take 1 tablet (100 mg total) by mouth daily.  Preferred Pharmacy:   [place] [address] [place] [address] [address]  Phone: [phone] Fax: [phone]      Please allow 2 business days for your provider to send your medication request or to reach out to discuss.

## 2023-08-18 ENCOUNTER — TELEMEDICINE (OUTPATIENT)
Dept: BEHAVIORAL HEALTH | Facility: CLINIC | Age: 38
End: 2023-08-18
Payer: COMMERCIAL

## 2023-08-18 DIAGNOSIS — F41.1 GENERALIZED ANXIETY DISORDER: Primary | ICD-10-CM

## 2023-08-18 DIAGNOSIS — F33.1 MODERATE EPISODE OF RECURRENT MAJOR DEPRESSIVE DISORDER (CMS/HCC): ICD-10-CM

## 2023-08-18 PROCEDURE — 90834 PSYTX W PT 45 MINUTES: CPT | Mod: 95 | Performed by: PSYCHOLOGIST

## 2023-08-21 NOTE — PROGRESS NOTES
[facility] Behavioral Health Services - Psychotherapy Follow-up Visit Note  Visit number: 13    Visit Type Performed: Video   Communication platform used for this encounter:  [email or web address] Video Visit (Epic Video Client)     Linnette Rivera was contacted today for a behavioral health visit.  Clinician confirmed identification of patient by name and birthdate, provider name, location of patient and clinician, and callback number in case disconnected.    Verification of Patient Location:  The patient affirms they are currently located in the following state: [place]  This visit was conducted via telehealth/telephone in lieu of an in-person visit due to precautions related to the COVID-19 pandemic.    Request for Consent:  You and I are about to have a tele-health check-in or visit. This is allowed because you are already a patient of our [facility] practice, and you have requested it.  This tele-health visit will be billed to your health insurance or you, if you are self-insured. You understand you will be responsible for any copayments or coinsurances that apply to your tele-health visit.  Before starting our telemedicine visit, I am required to get your consent for this virtual check-in or visit by tele-health . Do you consent?    Patient Response to Request for Consent: Yes      SUBJECTIVE     Symptoms  Depression symptoms: sleep difficulty  Anxiety symptoms: mild anxiety/worry      OBJECTIVE     Mental Status Evaluation  Patient's mood and affect were consistent with the context, and consistent with their baseline: Yes   Comments:  calm and pleasant, awake and alert; oriented to person, place, and time    Additional Assessments completed this visit       Suicidal Ideation/Homicidal Ideation Risk Assessment: not assessed. If not assessed, reason:    Denied SI    Interventions  Empathic Listening and Validation  Patient discussed somewhat of a letdown following her presentation in [employer] [facility]. She indicated that she had transitioned to  thinking about having children with her  and the plans surrounding that. We discussed pros and cons and not needing to follow one course vs another based on societal convention.     Psychotropic medications: no known adherence challenges, N/A   Current Outpatient Medications   Medication Sig Dispense Refill   • leuprolide (LUPRON DEPOT) 3.75 mg intramuscular injection Inject 3.75 mg into the shoulder, thigh, or buttocks.     • sertraline (ZOLOFT) 100 mg tablet Take 1 tablet (100 mg total) by mouth daily. 90 tablet 0   • sertraline (ZOLOFT) 25 mg tablet Take 1 tablet (25 mg total) by mouth daily. 90 tablet 0     No current facility-administered medications for this visit.       ASSESSMENT       Progress  Patient is looking for satisfaction in more parts of her life than her work and is undertaking some major life decisions as a result.     PLAN        Goals:  Decrease depressive symptoms (partially achieved)  Identify automatic thoughts and core beliefs and respond to them effectively.      Recommendations for treatment: Individual Therapy, weekly     Recommendations for Interventions: Cognitive Behavior Therapy and Empathic Listening and Validation        Next visit plan.   Discuss long term plans as well as thought process about having children.   I spent 45 minutes on this date of service performing the following activities: providing counseling and education and independently reviewing study/studies.

## 2023-09-01 ENCOUNTER — TELEMEDICINE (OUTPATIENT)
Dept: BEHAVIORAL HEALTH | Facility: CLINIC | Age: 38
End: 2023-09-01
Payer: COMMERCIAL

## 2023-09-01 DIAGNOSIS — F33.1 MODERATE EPISODE OF RECURRENT MAJOR DEPRESSIVE DISORDER (CMS/HCC): ICD-10-CM

## 2023-09-01 DIAGNOSIS — F41.1 GENERALIZED ANXIETY DISORDER: Primary | ICD-10-CM

## 2023-09-01 PROCEDURE — 90834 PSYTX W PT 45 MINUTES: CPT | Mod: 95 | Performed by: PSYCHOLOGIST

## 2023-09-05 NOTE — PROGRESS NOTES
[facility] Behavioral Health Services - Psychotherapy Follow-up Visit Note  Visit number: 14    Visit Type Performed: Video   Communication platform used for this encounter:  [email or web address] Video Visit (Epic Video Client)     Linnette Rivera was contacted today for a behavioral health visit.  Clinician confirmed identification of patient by name and birthdate, provider name, location of patient and clinician, and callback number in case disconnected.    Verification of Patient Location:  The patient affirms they are currently located in the following state: [place]  This visit was conducted via telehealth/telephone in lieu of an in-person visit due to precautions related to the COVID-19 pandemic.    Request for Consent:  You and I are about to have a tele-health check-in or visit. This is allowed because you are already a patient of our [facility] practice, and you have requested it.  This tele-health visit will be billed to your health insurance or you, if you are self-insured. You understand you will be responsible for any copayments or coinsurances that apply to your tele-health visit.  Before starting our telemedicine visit, I am required to get your consent for this virtual check-in or visit by tele-health . Do you consent?    Patient Response to Request for Consent: Yes      SUBJECTIVE     Symptoms  Depression symptoms: sleep difficulty  Anxiety symptoms: mild anxiety/worry      OBJECTIVE     Mental Status Evaluation  Patient's mood and affect were consistent with the context, and consistent with their baseline: Yes   Comments:  calm and pleasant, awake and alert; oriented to person, place, and time    Additional Assessments completed this visit       Suicidal Ideation/Homicidal Ideation Risk Assessment: not assessed. If not assessed, reason:    Denied SI    Interventions  Behavior Management and Empathic Listening and Validation  Patient discussed the importance of coming back to a schedule with regard to when she gets up,  begins working. We discussed decreasing screen time and scrolling through social media posts. We discussed these simple behavioral adjustments that may lead to managing her depression effectively going forward.     Psychotropic medications: no known adherence challenges, N/A   Current Outpatient Medications   Medication Sig Dispense Refill    leuprolide (LUPRON DEPOT) 3.75 mg intramuscular injection Inject 3.75 mg into the shoulder, thigh, or buttocks.      sertraline (ZOLOFT) 100 mg tablet Take 1 tablet (100 mg total) by mouth daily. 90 tablet 0    sertraline (ZOLOFT) 25 mg tablet Take 1 tablet (25 mg total) by mouth daily. 90 tablet 0     No current facility-administered medications for this visit.       ASSESSMENT       Progress  Patient is coping relatively well at the moment. However, she tends to get into patterns that are somewhat unhelpful to overall mental well-being.     PLAN        Goals:  Decrease depressive symptoms (partially achieved)  Identify automatic thoughts and core beliefs and respond to them effectively.      Recommendations for treatment: Individual Therapy, weekly     Recommendations for Interventions: Cognitive Behavior Therapy and Empathic Listening and Validation     Next visit plan.   Discuss decreasing scrolling on social media.   I spent 45 minutes on this date of service performing the following activities: providing counseling and education and independently reviewing study/studies.

## 2023-09-08 ENCOUNTER — TELEMEDICINE (OUTPATIENT)
Dept: BEHAVIORAL HEALTH | Facility: CLINIC | Age: 38
End: 2023-09-08
Payer: COMMERCIAL

## 2023-09-08 DIAGNOSIS — F33.1 MODERATE EPISODE OF RECURRENT MAJOR DEPRESSIVE DISORDER (CMS/HCC): ICD-10-CM

## 2023-09-08 DIAGNOSIS — F41.1 GENERALIZED ANXIETY DISORDER: Primary | ICD-10-CM

## 2023-09-08 PROCEDURE — 90834 PSYTX W PT 45 MINUTES: CPT | Mod: 95 | Performed by: PSYCHOLOGIST

## 2023-09-11 ENCOUNTER — TELEPHONE (OUTPATIENT)
Dept: PRIMARY CARE | Facility: CLINIC | Age: 38
End: 2023-09-11
Payer: COMMERCIAL

## 2023-09-11 RX ORDER — SERTRALINE HYDROCHLORIDE 100 MG/1
150 TABLET, FILM COATED ORAL DAILY
Qty: 90 TABLET | Refills: 0 | Status: SHIPPED | OUTPATIENT
Start: 2023-09-11 | End: 2024-04-03

## 2023-09-11 NOTE — PROGRESS NOTES
[facility] Behavioral Health Services - Psychotherapy Follow-up Visit Note  Visit number: 15    Visit Type Performed: Video   Communication platform used for this encounter:  GlobalPrint Systemshart Video Visit (Epic Video Client)     Linnette Rivera was contacted today for a behavioral health visit.  Clinician confirmed identification of patient by name and birthdate, provider name, location of patient and clinician, and callback number in case disconnected.    Verification of Patient Location:  The patient affirms they are currently located in the following state: [place]  This visit was conducted via telehealth/telephone in lieu of an in-person visit due to precautions related to the COVID-19 pandemic.    Request for Consent:  You and I are about to have a tele-health check-in or visit. This is allowed because you are already a patient of our [facility] practice, and you have requested it.  This tele-health visit will be billed to your health insurance or you, if you are self-insured. You understand you will be responsible for any copayments or coinsurances that apply to your tele-health visit.  Before starting our telemedicine visit, I am required to get your consent for this virtual check-in or visit by tele-health . Do you consent?    Patient Response to Request for Consent: Yes      SUBJECTIVE     Symptoms  Depression symptoms: sleep difficulty  Anxiety symptoms: mild anxiety/worry      OBJECTIVE     Mental Status Evaluation  Patient's mood and affect were consistent with the context, and consistent with their baseline: Yes   Comments:  calm and pleasant, awake and alert; oriented to person, place, and time    Additional Assessments completed this visit       Suicidal Ideation/Homicidal Ideation Risk Assessment: not assessed. If not assessed, reason:    Denied SI    Interventions  Behavior Management and Empathic Listening and Validation  Patient discussed being bored, possibly needing a change. She discussed going away for the weekend  next weekend and that this might be helpful. Patient indicated that she needs more novel experiences to keep herself occupied. We discussed also the importance of sleep, exercise, and diet in maintaining her mood.     Psychotropic medications: no known adherence challenges, N/A   Current Outpatient Medications   Medication Sig Dispense Refill    leuprolide (LUPRON DEPOT) 3.75 mg intramuscular injection Inject 3.75 mg into the shoulder, thigh, or buttocks.      sertraline (ZOLOFT) 100 mg tablet Take 1.5 tablets (150 mg total) by mouth daily. 90 tablet 0     No current facility-administered medications for this visit.       ASSESSMENT       Progress  Patient looking for new and novel experiences that might contribute to improvement in depression symptoms. Discuss behavioral activation strategies for depression.     PLAN        Goals:  Decrease depressive symptoms (partially achieved)  Identify automatic thoughts and core beliefs and respond to them effectively.      Recommendations for treatment: Individual Therapy, weekly     Recommendations for Interventions: Cognitive Behavior Therapy and Empathic Listening and Validation     Next visit plan.   Discuss trip to [place] and introducing other novelty into her life as possible.     I spent 45 minutes on this date of service performing the following activities: providing counseling and education and independently reviewing study/studies.

## 2023-09-11 NOTE — TELEPHONE ENCOUNTER
Medicine Refill Request    Last Office Visit: 3/22/2023   Last Consult Visit: Visit date not found  Last Telemedicine Visit: 1/11/2023 Aspen Shine MD    Next Appointment: 10/4/2023      Current Outpatient Medications:     leuprolide (LUPRON DEPOT) 3.75 mg intramuscular injection, Inject 3.75 mg into the shoulder, thigh, or buttocks., Disp: , Rfl:     sertraline (ZOLOFT) 100 mg tablet, Take 1 tablet (100 mg total) by mouth daily., Disp: 90 tablet, Rfl: 0    sertraline (ZOLOFT) 25 mg tablet, Take 1 tablet (25 mg total) by mouth daily., Disp: 90 tablet, Rfl: 0      BP Readings from Last 3 Encounters:   03/22/23 (!) 92/58   12/21/22 108/70   07/27/22 108/60       Recent Lab results:  Lab Results   Component Value Date    CHOL 181 05/17/2022   ,   Lab Results   Component Value Date    HDL 63 05/17/2022   ,   Lab Results   Component Value Date    LDLCALC 104 (H) 05/17/2022   ,   Lab Results   Component Value Date    TRIG 77 05/17/2022        Lab Results   Component Value Date    GLUCOSE 81 05/17/2022   , No results found for: HGBA1C      Lab Results   Component Value Date    CREATININE 0.62 05/17/2022       No results found for: TSH      No results found for: HGBA1C

## 2023-09-21 ENCOUNTER — TELEMEDICINE (OUTPATIENT)
Dept: BEHAVIORAL HEALTH | Facility: CLINIC | Age: 38
End: 2023-09-21
Payer: COMMERCIAL

## 2023-09-21 DIAGNOSIS — F41.1 GENERALIZED ANXIETY DISORDER: Primary | ICD-10-CM

## 2023-09-21 DIAGNOSIS — F33.1 MODERATE EPISODE OF RECURRENT MAJOR DEPRESSIVE DISORDER (CMS/HCC): ICD-10-CM

## 2023-09-21 PROCEDURE — 90834 PSYTX W PT 45 MINUTES: CPT | Mod: 95 | Performed by: PSYCHOLOGIST

## 2023-09-24 NOTE — PROGRESS NOTES
[facility] - Psychotherapy Follow-up Visit Note  Visit number: 16    Visit Type Performed: Video   Communication platform used for this encounter:  [employer] Video Visit (Epic Video Client)     Linnette Rivera was contacted today for a behavioral health visit.  Clinician confirmed identification of patient by name and birthdate, provider name, location of patient and clinician, and callback number in case disconnected.    Verification of Patient Location:  The patient affirms they are currently located in the following state: [place]  This visit was conducted via telehealth/telephone in lieu of an in-person visit due to precautions related to the COVID-19 pandemic.    Request for Consent:  You and I are about to have a tele-health check-in or visit. This is allowed because you are already a patient of our [facility] practice, and you have requested it.  This tele-health visit will be billed to your health insurance or you, if you are self-insured. You understand you will be responsible for any copayments or coinsurances that apply to your tele-health visit.  Before starting our telemedicine visit, I am required to get your consent for this virtual check-in or visit by tele-health . Do you consent?    Patient Response to Request for Consent: Yes      SUBJECTIVE     Symptoms  Depression symptoms: sleep difficulty  Anxiety symptoms: mild anxiety/worry      OBJECTIVE     Mental Status Evaluation  Patient's mood and affect were consistent with the context, and consistent with their baseline: Yes   Comments:  calm and pleasant, awake and alert; oriented to person, place, and time    Additional Assessments completed this visit       Suicidal Ideation/Homicidal Ideation Risk Assessment: not assessed. If not assessed, reason:    Denied SI    Interventions  Behavior Management and Empathic Listening and Validation  Patient discussed feeling without much energy or interest. 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 Patient was open to the idea. We discussed what really contributes to satisfaction as patient is not finding it in her work anymore. She does count her relationship with her  as a protective factor.     Psychotropic medications: no known adherence challenges, N/A   Current Outpatient Medications   Medication Sig Dispense Refill    leuprolide (LUPRON DEPOT) 3.75 mg intramuscular injection Inject 3.75 mg into the shoulder, thigh, or buttocks.      sertraline (ZOLOFT) 100 mg tablet Take 1.5 tablets (150 mg total) by mouth daily. 90 tablet 0     No current facility-administered medications for this visit.       ASSESSMENT       Progress  Patient somewhat unmotivated but realizes that she needs to try something else to find some level of contentment apart from her achievements at work, socially.     PLAN        Goals:  Decrease depressive symptoms (partially achieved)  Identify automatic thoughts and core beliefs and respond to them effectively.      Recommendations for treatment: Individual Therapy, weekly     Recommendations for Interventions: Cognitive Behavior Therapy and Empathic Listening and Validation     Next visit plan.   Discuss possibly enrolling in MBSR this fall. I sent links for local MBSR program.     I spent 45 minutes on this date of service performing the following activities: providing counseling and education and independently reviewing study/studies.

## 2023-10-04 ENCOUNTER — OFFICE VISIT (OUTPATIENT)
Dept: PRIMARY CARE | Facility: CLINIC | Age: 38
End: 2023-10-04
Payer: COMMERCIAL

## 2023-10-04 ENCOUNTER — TELEMEDICINE (OUTPATIENT)
Dept: BEHAVIORAL HEALTH | Facility: CLINIC | Age: 38
End: 2023-10-04
Payer: COMMERCIAL

## 2023-10-04 VITALS
SYSTOLIC BLOOD PRESSURE: 102 MMHG | BODY MASS INDEX: 19.45 KG/M2 | DIASTOLIC BLOOD PRESSURE: 60 MMHG | TEMPERATURE: 98 F | OXYGEN SATURATION: 99 % | HEART RATE: 78 BPM | HEIGHT: 61 IN | WEIGHT: 103 LBS

## 2023-10-04 DIAGNOSIS — E78.00 ELEVATED LDL CHOLESTEROL LEVEL: ICD-10-CM

## 2023-10-04 DIAGNOSIS — R73.09 ABNORMAL GLUCOSE: ICD-10-CM

## 2023-10-04 DIAGNOSIS — D50.9 MICROCYTIC ANEMIA: ICD-10-CM

## 2023-10-04 DIAGNOSIS — F33.1 MODERATE EPISODE OF RECURRENT MAJOR DEPRESSIVE DISORDER (CMS/HCC): ICD-10-CM

## 2023-10-04 DIAGNOSIS — R79.89 ABNORMAL CBC: ICD-10-CM

## 2023-10-04 DIAGNOSIS — F41.8 MIXED ANXIETY DEPRESSIVE DISORDER: Primary | Chronic | ICD-10-CM

## 2023-10-04 DIAGNOSIS — E55.9 VITAMIN D DEFICIENCY: ICD-10-CM

## 2023-10-04 DIAGNOSIS — Z13.1 SCREENING FOR DIABETES MELLITUS: ICD-10-CM

## 2023-10-04 DIAGNOSIS — F41.1 GENERALIZED ANXIETY DISORDER: Primary | ICD-10-CM

## 2023-10-04 PROBLEM — E61.1 IRON DEFICIENCY: Status: RESOLVED | Noted: 2022-02-02 | Resolved: 2023-10-04

## 2023-10-04 PROBLEM — Z83.3 FAMILY HISTORY OF DIABETES MELLITUS: Status: RESOLVED | Noted: 2022-02-02 | Resolved: 2023-10-04

## 2023-10-04 PROBLEM — Z00.00 GENERAL MEDICAL EXAM: Status: RESOLVED | Noted: 2022-02-02 | Resolved: 2023-10-04

## 2023-10-04 PROBLEM — K59.09 CHRONIC CONSTIPATION: Chronic | Status: RESOLVED | Noted: 2022-02-02 | Resolved: 2023-10-04

## 2023-10-04 PROCEDURE — 99214 OFFICE O/P EST MOD 30 MIN: CPT | Performed by: FAMILY MEDICINE

## 2023-10-04 PROCEDURE — 90834 PSYTX W PT 45 MINUTES: CPT | Mod: 95 | Performed by: PSYCHOLOGIST

## 2023-10-04 PROCEDURE — 3008F BODY MASS INDEX DOCD: CPT | Performed by: FAMILY MEDICINE

## 2023-10-04 RX ORDER — BUPROPION HYDROCHLORIDE 150 MG/1
150 TABLET ORAL DAILY
Qty: 90 TABLET | Refills: 0 | Status: SHIPPED | OUTPATIENT
Start: 2023-10-04 | End: 2023-10-20 | Stop reason: SDUPTHER

## 2023-10-04 ASSESSMENT — PATIENT HEALTH QUESTIONNAIRE - PHQ9
SUM OF ALL RESPONSES TO PHQ QUESTIONS 1-9: 10
SUM OF ALL RESPONSES TO PHQ9 QUESTIONS 1 & 2: 4

## 2023-10-04 NOTE — PROGRESS NOTES
[facility]    [address]  [place] [address]  Tel: [phone]   Fax: [phone]       History of Present Illness     Subjective     Patient ID: Linnette Rivera is a 38 y.o. female.  Chief Complaint   Patient presents with    Depression     ENDOMETRIAL PROCEDURE- 9/30/23- [facility]--finished oxycodone, taking Ondansetron 4mg and ibuprofe=en 600 mg       HPI   #MDD & GRACY   - long standing   - worsening in the last few months   - reports lack of motivation and indifference at times   - feels like she should be happy because she has a successful career and life but she does not feel happy on the inside   - she states this is different than her depression before because before she was motivated to help her self but this time around she feels like she does not have the motivation to do it   - she is seeing a therapist, every other week and as needed. They have just started discussing these things   - tried other medications in the past   - prozac helped but gave her nightmares   - lexapro tried but felt like it was not effective   - Wellbutrin: the first thing she tried as monotherapy, some side effecst with it but not sure what they were   - on Zoloft 150mg for 2 - 3 months, feels like it has not helped much. She believes it may have helped with some of the anxiety symptoms but in general reports minimal relief      Allergies and Medications       Allergies   Allergen Reactions    Thimerosal        Current Outpatient Medications   Medication Sig Dispense Refill    buPROPion XL (WELLBUTRIN XL) 150 mg 24 hr tablet Take 1 tablet (150 mg total) by mouth daily. 90 tablet 0    sertraline (ZOLOFT) 100 mg tablet Take 1.5 tablets (150 mg total) by mouth daily. 90 tablet 0    leuprolide (LUPRON DEPOT) 3.75 mg intramuscular injection Inject 3.75 mg into the shoulder, thigh, or buttocks.       No current facility-administered medications for this visit.        Review of Systems    "    Review of Systems As noted in HPI      Physical Examination       Objective     Visit Vitals  /60 (BP Location: Left upper arm)   Pulse 78   Temp 36.7 °C (98 °F)   Ht 1.549 m (5' 1\")   Wt 46.7 kg (103 lb)   SpO2 99%   BMI 19.46 kg/m²       Physical Exam  Constitutional:       General: She is not in acute distress.     Appearance: Normal appearance. She is not ill-appearing, toxic-appearing or diaphoretic.   HENT:      Head: Normocephalic and atraumatic.      Right Ear: External ear normal.      Left Ear: External ear normal.   Eyes:      General: No scleral icterus.        Right eye: No discharge.         Left eye: No discharge.      Extraocular Movements: Extraocular movements intact.   Cardiovascular:      Rate and Rhythm: Normal rate and regular rhythm.      Pulses: Normal pulses.      Heart sounds: No murmur heard.     No friction rub. No gallop.   Pulmonary:      Effort: Pulmonary effort is normal. No respiratory distress.      Breath sounds: Normal breath sounds. No wheezing, rhonchi or rales.   Musculoskeletal:      Right lower leg: No edema.      Left lower leg: No edema.   Skin:     General: Skin is warm and dry.   Neurological:      General: No focal deficit present.      Mental Status: She is alert and oriented to person, place, and time.   Psychiatric:         Mood and Affect: Mood normal.         Behavior: Behavior normal.          Assessment and Plan       Diagnoses and all orders for this visit:    Mixed anxiety depressive disorder (Primary)  Assessment & Plan:  - Currently her symptoms are more consistent with depressed and dysphoric mood rather than anxiety  - currently on Zoloft 150mg   - will add Wellbutrin xl 150mg as adjunctive of treatment  - She may need some down titration of the Zoloft as we increase Wellbutrin   - She will message me in 2 weeks with an update of her mood and symptoms.  At that time we will discuss the down tapering schedule for Zoloft  - I have also asked her to " increase her visits with her psychologist  - If the above combination does not work or patient feels is not effective or she has any other adverse effects, we discussed the role of GeneSight testing  -We will also check labs listed below to rule out other possible contributing metabolic factors to dysphoric mood  - Return to office in 2 months for follow-up    Orders:  -     buPROPion XL (WELLBUTRIN XL) 150 mg 24 hr tablet; Take 1 tablet (150 mg total) by mouth daily.  -     TSH w reflex FT4; Future    Vitamin D deficiency  -     Vitamin D 25 hydroxy; Future    Abnormal CBC  -     Iron and TIBC; Future  -     CBC and Differential; Future  -     Ferritin; Future  -     Folate; Future  -     Vitamin B12; Future  -     Vitamin B12; Future  -     Hemoglobin Electrophoresis    Elevated LDL cholesterol level  -     Lipid panel; Future    Screening for diabetes mellitus  -     Comprehensive metabolic panel; Future    Abnormal glucose  -     Hemoglobin A1c; Future    Microcytic anemia  -     Hemoglobin Electrophoresis             Dr.Navneet Ayala,   [facility]  10/4/2023

## 2023-10-04 NOTE — ASSESSMENT & PLAN NOTE
- Currently her symptoms are more consistent with depressed and dysphoric mood rather than anxiety  - currently on Zoloft 150mg   - will add Wellbutrin xl 150mg as adjunctive of treatment  - She may need some down titration of the Zoloft as we increase Wellbutrin   - She will message me in 2 weeks with an update of her mood and symptoms.  At that time we will discuss the down tapering schedule for Zoloft  - I have also asked her to increase her visits with her psychologist  - If the above combination does not work or patient feels is not effective or she has any other adverse effects, we discussed the role of GeneSight testing  -We will also check labs listed below to rule out other possible contributing metabolic factors to dysphoric mood  - Return to office in 2 months for follow-up

## 2023-10-10 NOTE — PROGRESS NOTES
[facility] Behavioral Health Services - Psychotherapy Follow-up Visit Note  Visit number: 17    Visit Type Performed: Video   Communication platform used for this encounter:  [employer] Video Visit (Epic Video Client)     Linnette Rivera was contacted today for a behavioral health visit.  Clinician confirmed identification of patient by name and birthdate, provider name, location of patient and clinician, and callback number in case disconnected.    Verification of Patient Location:  The patient affirms they are currently located in the following state: [place]  This visit was conducted via telehealth/telephone in lieu of an in-person visit due to precautions related to the COVID-19 pandemic.    Request for Consent:  You and I are about to have a tele-health check-in or visit. This is allowed because you are already a patient of our [facility] practice, and you have requested it.  This tele-health visit will be billed to your health insurance or you, if you are self-insured. You understand you will be responsible for any copayments or coinsurances that apply to your tele-health visit.  Before starting our telemedicine visit, I am required to get your consent for this virtual check-in or visit by tele-health . Do you consent?    Patient Response to Request for Consent: Yes      SUBJECTIVE     Symptoms  Depression symptoms: sleep difficulty  Anxiety symptoms: mild anxiety/worry      OBJECTIVE     Mental Status Evaluation  Patient's mood and affect were consistent with the context, and consistent with their baseline: Yes   Comments:  calm and pleasant, awake and alert; oriented to person, place, and time    Additional Assessments completed this visit       Suicidal Ideation/Homicidal Ideation Risk Assessment: not assessed. If not assessed, reason:    Denied SI    Interventions  Behavior Management and Empathic Listening and Validation  Met with patient following her surgical procedure. Patient indicated that she was fatigued.  Patient indicated that she and her  bought a home and was happy about this. Patient discussed potentially doing more mindfulness exercises and possibly taking an MBSR course.     Psychotropic medications: no known adherence challenges, N/A   Current Outpatient Medications   Medication Sig Dispense Refill    buPROPion XL (WELLBUTRIN XL) 150 mg 24 hr tablet Take 1 tablet (150 mg total) by mouth daily. 90 tablet 0    leuprolide (LUPRON DEPOT) 3.75 mg intramuscular injection Inject 3.75 mg into the shoulder, thigh, or buttocks.      sertraline (ZOLOFT) 100 mg tablet Take 1.5 tablets (150 mg total) by mouth daily. 90 tablet 0     No current facility-administered medications for this visit.       ASSESSMENT       Progress  Mood appeared fair. Patient is possibly less anhedonic and looking forward to making settlement. Future-oriented, hopeful.     PLAN        Goals:  Decrease depressive symptoms (partially achieved)  Identify automatic thoughts and core beliefs and respond to them effectively.      Recommendations for treatment: Individual Therapy, weekly     Recommendations for Interventions: Cognitive Behavior Therapy and Empathic Listening and Validation     Next visit plan.   Discuss leadup to making settlement and how to identify other things that might keep her future oriented and hopeful, mitigate symptoms of depression.     I spent 45 minutes on this date of service performing the following activities: providing counseling and education and independently reviewing study/studies.

## 2023-10-20 ENCOUNTER — TELEMEDICINE (OUTPATIENT)
Dept: BEHAVIORAL HEALTH | Facility: CLINIC | Age: 38
End: 2023-10-20
Payer: COMMERCIAL

## 2023-10-20 DIAGNOSIS — F41.1 GENERALIZED ANXIETY DISORDER: Primary | ICD-10-CM

## 2023-10-20 DIAGNOSIS — F33.1 MODERATE EPISODE OF RECURRENT MAJOR DEPRESSIVE DISORDER (CMS/HCC): ICD-10-CM

## 2023-10-20 PROCEDURE — 90837 PSYTX W PT 60 MINUTES: CPT | Mod: 95 | Performed by: PSYCHOLOGIST

## 2023-10-23 NOTE — PROGRESS NOTES
[facility] Behavioral Health Services - Psychotherapy Follow-up Visit Note  Visit number: 18    Visit Type Performed: Video   Communication platform used for this encounter:  [employer] Video Visit (Epic Video Client)     Linnette Rivera was contacted today for a behavioral health visit.  Clinician confirmed identification of patient by name and birthdate, provider name, location of patient and clinician, and callback number in case disconnected.    Verification of Patient Location:  The patient affirms they are currently located in the following state: [place]  This visit was conducted via telehealth/telephone in lieu of an in-person visit due to precautions related to the COVID-19 pandemic.    Request for Consent:  You and I are about to have a tele-health check-in or visit. This is allowed because you are already a patient of our [facility] practice, and you have requested it.  This tele-health visit will be billed to your health insurance or you, if you are self-insured. You understand you will be responsible for any copayments or coinsurances that apply to your tele-health visit.  Before starting our telemedicine visit, I am required to get your consent for this virtual check-in or visit by tele-health . Do you consent?    Patient Response to Request for Consent: Yes      SUBJECTIVE     Symptoms  Depression symptoms: sleep difficulty  Anxiety symptoms: mild anxiety/worry      OBJECTIVE     Mental Status Evaluation  Patient's mood and affect were consistent with the context, and consistent with their baseline: Yes   Comments:  calm and pleasant, awake and alert; oriented to person, place, and time    Additional Assessments completed this visit       Suicidal Ideation/Homicidal Ideation Risk Assessment: not assessed. If not assessed, reason:    Denied SI    Interventions  Behavior Management and Empathic Listening and Validation  Met with patient regarding her ongoing attempts to stay engaged. Patient discussed the difficulty  in losing passion and interest because despite all the accolades, she does not derive total satisfaction from her work. She is still deriving some satisfaction from getting the house. We also discussed how to be satisfied and happy with what one has as opposed to always being in a condition of want or need. She indicated that she was beginning to look into mindfulness work for herself outside of therapy.     Psychotropic medications: no known adherence challenges, somewhat effective   Current Outpatient Medications   Medication Sig Dispense Refill    buPROPion XL (WELLBUTRIN XL) 150 mg 24 hr tablet Take 1 tablet (150 mg total) by mouth daily. 90 tablet 1    leuprolide (LUPRON DEPOT) 3.75 mg intramuscular injection Inject 3.75 mg into the shoulder, thigh, or buttocks.      sertraline (ZOLOFT) 100 mg tablet Take 1.5 tablets (150 mg total) by mouth daily. 90 tablet 0     No current facility-administered medications for this visit.       ASSESSMENT       Progress  Mood appeared dysphoric but relatively stable. Patient is learning how to increase sense of satisfaction at a poin in her life wherein there is nothing left to achieve in terms of her professional success.     PLAN        Goals:  Decrease depressive symptoms (partially achieved)  Identify automatic thoughts and core beliefs and respond to them effectively.      Recommendations for treatment: Individual Therapy, weekly     Recommendations for Interventions: Cognitive Behavior Therapy and Empathic Listening and Validation     Next visit plan.   Discuss ways to continually increase sense of satisfaction via mindfulness exercises.     I spent 60 minutes on this date of service performing the following activities: providing counseling and education and independently reviewing study/studies.

## 2023-11-03 ENCOUNTER — TELEMEDICINE (OUTPATIENT)
Dept: BEHAVIORAL HEALTH | Facility: CLINIC | Age: 38
End: 2023-11-03
Payer: COMMERCIAL

## 2023-11-03 DIAGNOSIS — F33.1 MODERATE EPISODE OF RECURRENT MAJOR DEPRESSIVE DISORDER (CMS/HCC): ICD-10-CM

## 2023-11-03 DIAGNOSIS — F41.1 GENERALIZED ANXIETY DISORDER: Primary | ICD-10-CM

## 2023-11-03 PROCEDURE — 90834 PSYTX W PT 45 MINUTES: CPT | Mod: 95 | Performed by: PSYCHOLOGIST

## 2023-11-07 NOTE — PROGRESS NOTES
[facility] Behavioral Health Services - Psychotherapy Follow-up Visit Note  Visit number: 19    Visit Type Performed: Video   Communication platform used for this encounter:  [employer] Video Visit (Epic Video Client)     Linnette Rivera was contacted today for a behavioral health visit.  Clinician confirmed identification of patient by name and birthdate, provider name, location of patient and clinician, and callback number in case disconnected.    Verification of Patient Location:  The patient affirms they are currently located in the following state: [place]  This visit was conducted via telehealth/telephone in lieu of an in-person visit due to precautions related to the COVID-19 pandemic.    Request for Consent:  You and I are about to have a tele-health check-in or visit. This is allowed because you are already a patient of our [facility] practice, and you have requested it.  This tele-health visit will be billed to your health insurance or you, if you are self-insured. You understand you will be responsible for any copayments or coinsurances that apply to your tele-health visit.  Before starting our telemedicine visit, I am required to get your consent for this virtual check-in or visit by tele-health . Do you consent?    Patient Response to Request for Consent: Yes      SUBJECTIVE     Symptoms  Depression symptoms: sleep difficulty  Anxiety symptoms: mild anxiety/worry      OBJECTIVE     Mental Status Evaluation  Patient's mood and affect were consistent with the context, and consistent with their baseline: Yes   Comments:  calm and pleasant, awake and alert; oriented to person, place, and time    Additional Assessments completed this visit       Suicidal Ideation/Homicidal Ideation Risk Assessment: not assessed. If not assessed, reason:    Denied SI    Interventions  Behavior Management and Empathic Listening and Validation  We also discussed how she manages her pain. She discussed various ways that all come under the  heading of mental distraction. We also discussed the stress in relation to upcoming settlement. She discussed legal issues that are encumbering the deal.     Psychotropic medications: no known adherence challenges, somewhat effective   Current Outpatient Medications   Medication Sig Dispense Refill    buPROPion XL (WELLBUTRIN XL) 150 mg 24 hr tablet Take 1 tablet (150 mg total) by mouth daily. 90 tablet 1    leuprolide (LUPRON DEPOT) 3.75 mg intramuscular injection Inject 3.75 mg into the shoulder, thigh, or buttocks.      sertraline (ZOLOFT) 100 mg tablet Take 1.5 tablets (150 mg total) by mouth daily. 90 tablet 0     No current facility-administered medications for this visit.       ASSESSMENT       Progress  Mood appeared improved despite coping with acute postoperative pain. There is some stress surrounding settlement.     PLAN        Goals:  Decrease depressive symptoms (partially achieved)  Identify automatic thoughts and core beliefs and respond to them effectively.      Recommendations for treatment: Individual Therapy, weekly     Recommendations for Interventions: Cognitive Behavior Therapy and Empathic Listening and Validation     Next visit plan.   Discuss ways to cope with pain via mental distraction. Discuss settlement and managing stress surrounding this.     I spent 45 minutes on this date of service performing the following activities: providing counseling and education and independently reviewing study/studies.

## 2023-11-15 ENCOUNTER — TELEMEDICINE (OUTPATIENT)
Dept: BEHAVIORAL HEALTH | Facility: CLINIC | Age: 38
End: 2023-11-15
Payer: COMMERCIAL

## 2023-11-15 DIAGNOSIS — F41.1 GENERALIZED ANXIETY DISORDER: Primary | ICD-10-CM

## 2023-11-15 DIAGNOSIS — F33.1 MODERATE EPISODE OF RECURRENT MAJOR DEPRESSIVE DISORDER (CMS/HCC): ICD-10-CM

## 2023-11-15 PROCEDURE — 90834 PSYTX W PT 45 MINUTES: CPT | Mod: 95 | Performed by: PSYCHOLOGIST

## 2023-11-17 NOTE — PROGRESS NOTES
[facility] Behavioral Health Services - Psychotherapy Follow-up Visit Note  Visit number: 20    Visit Type Performed: Video   Communication platform used for this encounter:  iCrackedhart Video Visit (Epic Video Client)     Linnette Rivera was contacted today for a behavioral health visit.  Clinician confirmed identification of patient by name and birthdate, provider name, location of patient and clinician, and callback number in case disconnected.    Verification of Patient Location:  The patient affirms they are currently located in the following state: [place]  This visit was conducted via telehealth/telephone in lieu of an in-person visit due to precautions related to the COVID-19 pandemic.    Request for Consent:  You and I are about to have a tele-health check-in or visit. This is allowed because you are already a patient of our [facility] practice, and you have requested it.  This tele-health visit will be billed to your health insurance or you, if you are self-insured. You understand you will be responsible for any copayments or coinsurances that apply to your tele-health visit.  Before starting our telemedicine visit, I am required to get your consent for this virtual check-in or visit by tele-health . Do you consent?    Patient Response to Request for Consent: Yes      SUBJECTIVE     Symptoms  Depression symptoms: sleep difficulty  Anxiety symptoms: mild anxiety/worry      OBJECTIVE     Mental Status Evaluation  Patient's mood and affect were consistent with the context, and consistent with their baseline: Yes   Comments:  calm and pleasant, awake and alert; oriented to person, place, and time    Additional Assessments completed this visit       Suicidal Ideation/Homicidal Ideation Risk Assessment: not assessed. If not assessed, reason:    Denied SI    Interventions  Behavior Management and Empathic Listening and Validation  We discussed that she and her  will likely make settlement on the house. She indicated that  she continues to maintain therapeutic gains. She related that she will be traveling to see her parents in [place] of [place] and focused on how to make this the best visit, given that past visits have been difficult. She discussed the difference in thinking between she and some of the more traditional people from her town. Patient indicated that she plans to be agreeable and hopefully this will help. Pt discussed deciding against going to another team in her work that would carry more reward potentially but also place her under the supervision of an individual who is characterized as frequently irritated and under lot of pressure.     Psychotropic medications: no known adherence challenges, somewhat effective   Current Outpatient Medications   Medication Sig Dispense Refill    buPROPion XL (WELLBUTRIN XL) 150 mg 24 hr tablet Take 1 tablet (150 mg total) by mouth daily. 90 tablet 1    leuprolide (LUPRON DEPOT) 3.75 mg intramuscular injection Inject 3.75 mg into the shoulder, thigh, or buttocks.      sertraline (ZOLOFT) 100 mg tablet Take 1.5 tablets (150 mg total) by mouth daily. 90 tablet 0     No current facility-administered medications for this visit.       ASSESSMENT       Progress  Mood appeared hopeful. Patient appeared less encumbered by her worries. She continues to realize that her personal life and mental health takes priority over continuing to grow her career at this point in her life.     PLAN     Goals:  Decrease depressive symptoms (partially achieved)  Identify automatic thoughts and core beliefs and respond to them effectively.      Recommendations for treatment: Individual Therapy, weekly     Recommendations for Interventions: Cognitive Behavior Therapy and Empathic Listening and Validation     Next visit plan.   Discuss ways prioritize mental health over work achievements. Pt also indicated that she wanted to discuss relationship issues at next session.     I spent 45 minutes on this date of  service performing the following activities: providing counseling and education.

## 2023-12-18 ENCOUNTER — TELEMEDICINE (OUTPATIENT)
Dept: BEHAVIORAL HEALTH | Facility: CLINIC | Age: 38
End: 2023-12-18
Payer: COMMERCIAL

## 2023-12-18 DIAGNOSIS — F41.1 GENERALIZED ANXIETY DISORDER: Primary | ICD-10-CM

## 2023-12-18 DIAGNOSIS — F33.1 MODERATE EPISODE OF RECURRENT MAJOR DEPRESSIVE DISORDER (CMS/HCC): ICD-10-CM

## 2023-12-18 PROCEDURE — 90834 PSYTX W PT 45 MINUTES: CPT | Mod: 95 | Performed by: PSYCHOLOGIST

## 2023-12-18 NOTE — PROGRESS NOTES
[facility] Behavioral Health Services - Psychotherapy Follow-up Visit Note  Visit number: 21     Visit Type Performed: Video   Communication platform used for this encounter:  [employer] Video Visit (Epic Video Client)     Linnette Rivera was contacted today for a behavioral health visit.  Clinician confirmed identification of patient by name and birthdate, provider name, location of patient and clinician, and callback number in case disconnected.    Verification of Patient Location:  The patient affirms they are currently located in the following state: [place]  This visit was conducted via telehealth/telephone in lieu of an in-person visit due to precautions related to the COVID-19 pandemic.    Request for Consent:  You and I are about to have a tele-health check-in or visit. This is allowed because you are already a patient of our [facility] practice, and you have requested it.  This tele-health visit will be billed to your health insurance or you, if you are self-insured. You understand you will be responsible for any copayments or coinsurances that apply to your tele-health visit.  Before starting our telemedicine visit, I am required to get your consent for this virtual check-in or visit by tele-health . Do you consent?    Patient Response to Request for Consent: Yes    SUBJECTIVE      Symptoms  Depression symptoms: sleep difficulty  Anxiety symptoms: mild anxiety/worry        OBJECTIVE      Mental Status Evaluation  Patient's mood and affect were consistent with the context, and consistent with their baseline: Yes   Comments:  calm and pleasant, awake and alert; oriented to person, place, and time     Additional Assessments completed this visit     Suicidal Ideation/Homicidal Ideation Risk Assessment: not assessed. If not assessed, reason:    Denied SI     Interventions  Behavior Management and Empathic Listening and Validation; ACT; Goal setting  We discussed the difficulty she has with concentrating and how to address  this by seeing psychiatrist potentially. We also discussed some new therapeutic goals. Discussed defusion from ruminative thinking.     Psychotropic medications: no known adherence challenges, somewhat effective   Current Medications          Current Outpatient Medications   Medication Sig Dispense Refill   • buPROPion XL (WELLBUTRIN XL) 150 mg 24 hr tablet Take 1 tablet (150 mg total) by mouth daily. 90 tablet 1   • leuprolide (LUPRON DEPOT) 3.75 mg intramuscular injection Inject 3.75 mg into the shoulder, thigh, or buttocks.       • sertraline (ZOLOFT) 100 mg tablet Take 1.5 tablets (150 mg total) by mouth daily. 90 tablet 0      No current facility-administered medications for this visit.            ASSESSMENT         Progress  Pt described meeting goal of decreasing depression but reports that side effects of medication leave her with difficulty concentrating and feeling somewhat emotionally neutral. She plans to discuss this with psychiatry and instructed to call central intake. We have developed some new therapeutic goals (see below).       PLAN     Goals:  1. Not people-pleasing.  2. Be determined/decisive; take responsibility.   3. Let go of things you can't control/learn to deal with uncertainty.     Recommendations  Individual Therapy  45 minutes 1-2 times monthly    Next visit plan:  Address new goals and address any changes to psychiatric med regimen      I spent 45 minutes on this date of service performing the following activities: providing counseling and education.

## 2024-01-05 ENCOUNTER — TELEMEDICINE (OUTPATIENT)
Dept: BEHAVIORAL HEALTH | Facility: CLINIC | Age: 39
End: 2024-01-05
Payer: COMMERCIAL

## 2024-01-05 DIAGNOSIS — F33.1 MODERATE EPISODE OF RECURRENT MAJOR DEPRESSIVE DISORDER (CMS/HCC): ICD-10-CM

## 2024-01-05 DIAGNOSIS — F41.1 GENERALIZED ANXIETY DISORDER: Primary | ICD-10-CM

## 2024-01-05 PROCEDURE — 90834 PSYTX W PT 45 MINUTES: CPT | Mod: 95 | Performed by: PSYCHOLOGIST

## 2024-01-06 NOTE — PROGRESS NOTES
[facility] Behavioral Health Services - Psychotherapy Follow-up Visit Note  Visit number: 22    Visit Type Performed: Video   Communication platform used for this encounter:  [employer] Video Visit (Epic Video Client)     Linnette Rivera was contacted today for a behavioral health visit.  Clinician confirmed identification of patient by name and birthdate, provider name, location of patient and clinician, and callback number in case disconnected.    Verification of Patient Location:  The patient affirms they are currently located in the following state: [place]  This visit was conducted via telehealth/telephone in lieu of an in-person visit due to precautions related to the COVID-19 pandemic.    Request for Consent:  You and I are about to have a tele-health check-in or visit. This is allowed because you are already a patient of our [facility] practice, and you have requested it.  This tele-health visit will be billed to your health insurance or you, if you are self-insured. You understand you will be responsible for any copayments or coinsurances that apply to your tele-health visit.  Before starting our telemedicine visit, I am required to get your consent for this virtual check-in or visit by tele-health . Do you consent?    Patient Response to Request for Consent: Yes    SUBJECTIVE      Symptoms  Depression symptoms: sleep difficulty  Anxiety symptoms: mild anxiety/worry        OBJECTIVE      Mental Status Evaluation  Patient's mood and affect were consistent with the context, and consistent with their baseline: Yes   Comments:  calm and pleasant, awake and alert; oriented to person, place, and time     Additional Assessments completed this visit     Suicidal Ideation/Homicidal Ideation Risk Assessment: not assessed. If not assessed, reason:    Denied SI     Interventions  Behavior Management and Empathic Listening and Validation; Goal setting.  We discussed her new therapy goals. She discussed her  not finishing his  dissertation as placing more pressure on her and her being the primary source of the couple's income. She indicated that this forces her into a situation where she can't change jobs if the pressure begins to mount at her new job. We dicussed her options in terms of how to talk to him about it. We discussed focusing on what she can change about the situation. We also discussed her not conforming to traditional gender roles.      Psychotropic medications: no known adherence challenges, somewhat effective   Current Medications          Current Outpatient Medications   Medication Sig Dispense Refill   • buPROPion XL (WELLBUTRIN XL) 150 mg 24 hr tablet Take 1 tablet (150 mg total) by mouth daily. 90 tablet 1   • leuprolide (LUPRON DEPOT) 3.75 mg intramuscular injection Inject 3.75 mg into the shoulder, thigh, or buttocks.       • sertraline (ZOLOFT) 100 mg tablet Take 1.5 tablets (150 mg total) by mouth daily. 90 tablet 0      No current facility-administered medications for this visit.            ASSESSMENT         Progress  Pt refocusing on what she can change about her current situation and what she has to accept. She can continue to discuss with her  her needs from him in the relationship I.e. to finish his dissertation.        PLAN     Goals:  1. Not people-pleasing.  2. Be determined/decisive; take responsibility.   3. Let go of things you can't control/learn to deal with uncertainty.     Recommendations  Individual Therapy  45 minutes 1-2 times monthly    Next visit plan:  Address continuing to focus on making her needs known to her partner which is what she can control in this situation.       I spent 45 minutes on this date of service performing the following activities: providing counseling and education.

## 2024-01-19 ENCOUNTER — TELEMEDICINE (OUTPATIENT)
Dept: BEHAVIORAL HEALTH | Facility: CLINIC | Age: 39
End: 2024-01-19
Payer: COMMERCIAL

## 2024-01-19 DIAGNOSIS — F41.1 GENERALIZED ANXIETY DISORDER: Primary | ICD-10-CM

## 2024-01-19 DIAGNOSIS — F33.1 MODERATE EPISODE OF RECURRENT MAJOR DEPRESSIVE DISORDER (CMS/HCC): ICD-10-CM

## 2024-01-19 PROCEDURE — 90834 PSYTX W PT 45 MINUTES: CPT | Mod: 95 | Performed by: PSYCHOLOGIST

## 2024-01-19 NOTE — PROGRESS NOTES
[facility] Behavioral Health Services - Psychotherapy Follow-up Visit Note  Visit number: 23    Visit Type Performed: Video   Communication platform used for this encounter:  [employer]art Video Visit (Epic Video Client)     Linnette Rivera was contacted today for a behavioral health visit.  Clinician confirmed identification of patient by name and birthdate, provider name, location of patient and clinician, and callback number in case disconnected.    Verification of Patient Location:  The patient affirms they are currently located in the following state: [place]  This visit was conducted via telehealth/telephone in lieu of an in-person visit due to precautions related to the COVID-19 pandemic.    Request for Consent:  You and I are about to have a tele-health check-in or visit. This is allowed because you are already a patient of our [facility] practice, and you have requested it.  This tele-health visit will be billed to your health insurance or you, if you are self-insured. You understand you will be responsible for any copayments or coinsurances that apply to your tele-health visit.  Before starting our telemedicine visit, I am required to get your consent for this virtual check-in or visit by tele-health . Do you consent?    Patient Response to Request for Consent: Yes    SUBJECTIVE      Symptoms  Depression symptoms: sleep difficulty  Anxiety symptoms: mild anxiety/worry        OBJECTIVE      Mental Status Evaluation  Patient's mood and affect were consistent with the context, and consistent with their baseline: Yes   Comments:  calm and pleasant, awake and alert; oriented to person, place, and time     Additional Assessments completed this visit     Suicidal Ideation/Homicidal Ideation Risk Assessment: not assessed. If not assessed, reason:    Denied SI     Interventions  Behavior Management and Empathic Listening and Validation; Goal setting.  We discussed her tendency to need validation from others. We discussed ways to  challenge her thinking about this and behave in different ways. We discussed saying no with regard to something her  is asking her to do (train their dog). We discussed sitting with and tolerating the awkwardness associated with saying no.       Psychotropic medications: no known adherence challenges, somewhat effective   Current Medications          Current Outpatient Medications   Medication Sig Dispense Refill   • buPROPion XL (WELLBUTRIN XL) 150 mg 24 hr tablet Take 1 tablet (150 mg total) by mouth daily. 90 tablet 1   • leuprolide (LUPRON DEPOT) 3.75 mg intramuscular injection Inject 3.75 mg into the shoulder, thigh, or buttocks.       • sertraline (ZOLOFT) 100 mg tablet Take 1.5 tablets (150 mg total) by mouth daily. 90 tablet 0      No current facility-administered medications for this visit.            ASSESSMENT         Progress  Pt beginning to explore tendency to need validation which is the core of her tendency to work too hard and do too much to please other people.        PLAN     Goals:  1. Not people-pleasing.  2. Be determined/decisive; take responsibility.   3. Let go of things you can't control/learn to deal with uncertainty.     Recommendations  Individual Therapy  45 minutes 1-2 times monthly    Next visit plan:  Address continuing to explore and challenge need for validation from peers, .       I spent 45 minutes on this date of service performing the following activities: providing counseling and education.

## 2024-02-02 ENCOUNTER — TELEMEDICINE (OUTPATIENT)
Dept: BEHAVIORAL HEALTH | Facility: CLINIC | Age: 39
End: 2024-02-02
Payer: COMMERCIAL

## 2024-02-02 DIAGNOSIS — F33.1 MODERATE EPISODE OF RECURRENT MAJOR DEPRESSIVE DISORDER (CMS/HCC): ICD-10-CM

## 2024-02-02 DIAGNOSIS — F41.1 GENERALIZED ANXIETY DISORDER: Primary | ICD-10-CM

## 2024-02-02 PROCEDURE — 90834 PSYTX W PT 45 MINUTES: CPT | Mod: 95 | Performed by: PSYCHOLOGIST

## 2024-02-06 NOTE — PROGRESS NOTES
[facility] Behavioral Health Services - Psychotherapy Follow-up Visit Note  Visit number: 24    Visit Type Performed: Video   Communication platform used for this encounter:  "[employer]hart Video Visit (Epic Video Client)     Linnette Rivera was contacted today for a behavioral health visit.  Clinician confirmed identification of patient by name and birthdate, provider name, location of patient and clinician, and callback number in case disconnected.    Verification of Patient Location:  The patient affirms they are currently located in the following state: [place]  This visit was conducted via telehealth/telephone in lieu of an in-person visit due to precautions related to the COVID-19 pandemic.    Request for Consent:  You and I are about to have a tele-health check-in or visit. This is allowed because you are already a patient of our [facility] practice, and you have requested it.  This tele-health visit will be billed to your health insurance or you, if you are self-insured. You understand you will be responsible for any copayments or coinsurances that apply to your tele-health visit.  Before starting our telemedicine visit, I am required to get your consent for this virtual check-in or visit by tele-health . Do you consent?    Patient Response to Request for Consent: Yes    SUBJECTIVE      Symptoms  Depression symptoms: sleep difficulty  Anxiety symptoms: mild anxiety/worry        OBJECTIVE      Mental Status Evaluation  Patient's mood and affect were consistent with the context, and consistent with their baseline: Yes   Comments:  calm and pleasant, awake and alert; oriented to person, place, and time     Additional Assessments completed this visit     Suicidal Ideation/Homicidal Ideation Risk Assessment: not assessed. If not assessed, reason:    Denied SI     Interventions  Behavior Management and Empathic Listening and Validation  We discussed her feeling happy with her 's progress on his dissertation. Challenged  automatic thoughts regarding expectations for herself at work. She discussed her decision to speak with physician about potentially reducing anitdepressant dosage as she reportedly cannot think clearly.      Psychotropic medications: no known adherence challenges, somewhat effective   Current Medications          Current Outpatient Medications   Medication Sig Dispense Refill   • buPROPion XL (WELLBUTRIN XL) 150 mg 24 hr tablet Take 1 tablet (150 mg total) by mouth daily. 90 tablet 1   • leuprolide (LUPRON DEPOT) 3.75 mg intramuscular injection Inject 3.75 mg into the shoulder, thigh, or buttocks.       • sertraline (ZOLOFT) 100 mg tablet Take 1.5 tablets (150 mg total) by mouth daily. 90 tablet 0      No current facility-administered medications for this visit.            ASSESSMENT         Progress  Pt feeling less pressure at work because she sees her  progressing through the project that will allow him to contribute more financially.         PLAN     Goals:  1. Not people-pleasing.  2. Be determined/decisive; take responsibility.   3. Let go of things you can't control/learn to deal with uncertainty.     Recommendations  Individual Therapy  45 minutes 1-2 times monthly    Next visit plan:  Address continuing to maintain stability and her decision to discuss change to medication regimen with physician consultation.     I spent 45 minutes on this date of service performing the following activities: providing counseling and education.

## 2024-02-09 ENCOUNTER — TELEPHONE (OUTPATIENT)
Dept: BEHAVIORAL HEALTH | Facility: CLINIC | Age: 39
End: 2024-02-09
Payer: COMMERCIAL

## 2024-07-15 ENCOUNTER — DOCUMENTATION (OUTPATIENT)
Dept: BEHAVIORAL HEALTH | Facility: CLINIC | Age: 39
End: 2024-07-15
Payer: COMMERCIAL

## 2024-07-15 PROCEDURE — 99999 PR OFFICE/OUTPT VISIT,PROCEDURE ONLY: CPT | Performed by: PSYCHOLOGIST

## 2024-07-15 NOTE — DISCHARGE SUMMARY
Discharge Summary         Patient Name: Linnette Rivera    : 1985    Treatment start date: 3/15/23    Date of last visit: 24           Discharge Reason: Incomplete Discharge         Reason for admission and treatment: Depression         Services offered and response to treatment: CBT, ACT, supportive counseling.         Client status - Condition upon discharge: fair         Suicidal Ideation/Homicidal Ideation Risk Assessment not assessed. If not assessed, reason:      Denied SI or plan to harm self at last appointment; currently under care by Dr. Limon (psychiatry)      Screening/assessment measures administered during last  visit                    Clinical concerns to be addressed in continued care: depression, anxiety         Aftercare appointments: continue to follow up regularly with Dr. Limon as she has been doing.          Special Instructions: None           Mental Health Crisis Support:     [facility] Crisis Number: [phone]    [facility] Crisis Number: [phone]    [facility] Crisis Number: [phone]    [facility] Crisis Number: [phone]            Brannon Vieyra PSY.D @ 9:40 AM

## 2024-08-05 ENCOUNTER — OFFICE VISIT (OUTPATIENT)
Dept: PRIMARY CARE | Facility: CLINIC | Age: 39
End: 2024-08-05
Payer: COMMERCIAL

## 2024-08-05 VITALS
DIASTOLIC BLOOD PRESSURE: 60 MMHG | SYSTOLIC BLOOD PRESSURE: 100 MMHG | HEART RATE: 78 BPM | BODY MASS INDEX: 21.48 KG/M2 | HEIGHT: 60 IN | TEMPERATURE: 98 F | WEIGHT: 109.4 LBS | OXYGEN SATURATION: 99 %

## 2024-08-05 DIAGNOSIS — Z11.59 ENCOUNTER FOR HEPATITIS C SCREENING TEST FOR LOW RISK PATIENT: ICD-10-CM

## 2024-08-05 DIAGNOSIS — Z01.818 PRE-OP EVALUATION: Primary | ICD-10-CM

## 2024-08-05 DIAGNOSIS — Z11.4 SCREENING FOR HIV (HUMAN IMMUNODEFICIENCY VIRUS): ICD-10-CM

## 2024-08-05 DIAGNOSIS — Z11.59 NEED FOR HEPATITIS B SCREENING TEST: ICD-10-CM

## 2024-08-05 PROCEDURE — 3008F BODY MASS INDEX DOCD: CPT | Performed by: FAMILY MEDICINE

## 2024-08-05 PROCEDURE — 99214 OFFICE O/P EST MOD 30 MIN: CPT | Performed by: FAMILY MEDICINE

## 2024-08-05 PROCEDURE — 93000 ELECTROCARDIOGRAM COMPLETE: CPT | Performed by: FAMILY MEDICINE

## 2024-08-05 NOTE — PATIENT INSTRUCTIONS
CM   [address],   [address] [place], [place]  [address] [facility] [address], [place], [place]     [phone] AHDH, Bipolar, Anxiety/Panic, OCD, PTSD, Stress Management, Med Eval & Management, Grief  Hypnosis, CBT  [email or web address]        [facility] MILTON GARDUNO  [address], [address]  [place] [address]      Psychoeducational assessments: [employer]      Autism spectrum evals: [employer] [phone] Psychiatric/psychological evaluations, therapy and medication management , ADHD        [facility]   [address], [address], [facility] DUSTY  *Other locations [place], [place], [place], [place], [facility] [phone] [email or web address]        ML: [facility]  BRENT TO; SHONNA STEELE  [address] [place] [address]  [place]  [address] [place] [address]     [phone]         [facility]   [address] [place] [address] AVE. [employer], [place]   [address] [place], [place]   [phone]   Psychotherapy, depression, anxiety, bipolar, emotional health, addictions, anger, eating disorders , ADD        [facility], [facility]  [address] [place]   [address] [facility]    [phone]   ADD, Anxiety, Depression, Grief, Relationship, PTSD, LGBTQAI, Bipolar         [facility]   [address] , [place] [address]  [address], , [place] [address] [phone] ADHD, Anxiety, Depression, Bipolar, Eating disorders, OCD, PTSH, Schizophrenia   *Therapist  and Psychiatrists         [facility]  [address], [place]  [phone]         [facility], [facility]  [address] [facility], [address] [phone]   Biofeedback, Psychotherapy, Nutrition         [facility]  [address], [address], [place]  [place] MONTANO [phone]   *Pediatric           [facility]  [address] [facility]. [place] TAVARE[place] [address]  [address], [place]OS [phone] *Pediatric   School issues, Autism, Divorce, Testing/Assessments, Eating, Grief/Loss, LGBTQ, ADHD         [facility]  DR. PEREZ & DR. MORSE  [address] [facility] [address] [place] [address]   [phone]   *Pediatric   DOES NOT TAKE INSURANCE                 [facility]   [address] [facility] [place] [address] [phone] *PEDIATRIC   DOES NOT TAKE INSURANCE         [facility] [employer]  [address] [place] [address]  [address] [facility] [place] [address]  1 [place] [address]  [address] [facility] [place] [address] [phone]   *PEDIATRIC     Other locations in [facility], [place]         PUAL GUZMAN  [address] [place] [id number] [phone] *Psychiatry  *Accepts insurance          [facility]   [address] [place] [address]  [phone] *Psychiatry         [facility]  [address], [place] [address]  [phone] CBT, Assessment/Testing, Marriage/Sex, Autism, Biofeedback   *Pediatric         [facility]  [address] [place] [address] DUQUE [phone] *TEENS  IOP, Day school, Holistic Therapy         DINO SONI MD  [address] [place] [address] [phone] *Psychiatry         [facility] SP[facility], [facility]   [address] [facility] [address] [phone] Fernanda.[facility]        [facility]  [address] [place], [address] [phone] Diagnostic assessments, neuropsychological testing, treatment for ages 12+        [facility]  [address] [place]  [place] [address]   [phone]         MILLIE ALEXANDRE, PSYCHIATRIST & COUNSELING   [address] [facility]   [address] [place] [phone] [email or web address]        OSCAR DATTU  [address], [place] [address] [phone] *Psychiatry         [facility]   [address] [facility] [place]  [phone] Psych and counselling-neurocognitive testing        GANGA PAEZ, COUNSELOR/THERAPIST  [address] [facility] [address], [place] [address]  [address] [place] [address] [phone] [email or web address]  Marriage & Family therapy        NONA ALEJANDRE NARAYAN [facility]  [address], [place] [address]   [phone] Adult and children (>5)  Psychiatric Evaluation, Psychopharmacogenomic testing (GeneSight), Medication Management, Counseling        [facility]  [address] [facility] [address] [facility] [address] [phone] Children, Teens and Adults  ADHD, Autism, Sleep disorders, Anxiety, Mood,         [facility]   [address] [facility] [address]  Marysol Boyle-Certified Biofeedback Specialist  Juan Antonio Chan-Certified Biofeedback Specialist   [phone] Holistic Approach/Stress Management, Biofeedback         [facility]  [address] [facility] [address] [facility] [address]  [phone] [facility] partner   Children, Teens, Adults          CARMEN SON  [address] [place] [address]  [phone]   Eating disorders  [email or web address]           CATARINO AGUAYO  [address] [facility] [address] [place] [address] [phone]   Eating disorders  [email or web address]         [facility]  MARIBEL MEYER  [place]  [phone]   Eating disorders         JOSIAS MEYER  [address] [facility]  [place], [place]  [phone]   Eating disorders

## 2024-08-05 NOTE — PROGRESS NOTES
"  Pre-Op Evaluation:  HPI   Linnette Rivera is a 38 y.o. female presenting for a preoperative evaluation for [place]. The surgeon asked for general \"clearance\" and specific labs       Functional Capacity Assessment  Activity Metabolic Equivalents (METS)   Heavy work around house - scrubbing floors, lifting/moving heavy furniture, climb 2 flights of stairs 4-10            Review of Systems   Patient denies the following symptoms: Fever, chills, weight change, recent upper respiratory symptoms, chronic cough, hemoptysis, dyspnea, tuberculosis exposure, chest pain or pressure at rest or exertion, orthopnea, peripheral edema, heart racing, palpitations, abdominal pain, change in bowel habits, melena, hematochezia, urinary tract symptoms, hematuria, unusual bleeding, TIA or CVA symptoms.       Patient History      Past Medical History:  Linnette  has a past medical history of Chronic constipation (02/02/2022), Endometriosis, Iron deficiency (02/02/2022), Lumbar radiculopathy (02/01/2022), Mixed anxiety depressive disorder (02/01/2022), and Vitamin D deficiency (05/18/2022).    Past Surgical History:   Patient denies history of adverse reaction to anesthesia.  Linnette  has no past surgical history on file.    Medications:     Current Outpatient Medications:     desvenlafaxine succinate (PRISTIQ) 25 mg tablet extended release 24 hr, Take 1 tablet (25 mg total) by mouth daily for 7 doses., Disp: 7 tablet, Rfl: 0    vortioxetine (TRINTELLIX) 10 mg tablet, Take 1 tablet (10 mg total) by mouth daily., Disp: 30 tablet, Rfl: 0    Allergies:   Linnette is allergic to thimerosal.    Family History:  Negative for adverse reaction to anesthesia nor temperature regulation issues after anesthesia.    Social History:   Negative for tobacco alcohol or illicit drug use.    Patient has no history of the following:   Moderate valvular disease, congenital heart disease, severe systemic disease, obstructive " sleep apnea.         Objective   Visit Vitals  /60 (BP Location: Right upper arm, Patient Position: Sitting)   Pulse 78   Temp 36.7 °C (98 °F) (Temporal)   Ht 1.524 m (5')   Wt 49.6 kg (109 lb 6.4 oz)   LMP 07/29/2024   SpO2 99%   BMI 21.37 kg/m²     Physical Exam  Constitutional:       General: She is not in acute distress.     Appearance: Normal appearance. She is not ill-appearing, toxic-appearing or diaphoretic.   HENT:      Head: Normocephalic and atraumatic.      Right Ear: External ear normal.      Left Ear: External ear normal.   Eyes:      General: No scleral icterus.        Right eye: No discharge.         Left eye: No discharge.      Extraocular Movements: Extraocular movements intact.      Conjunctiva/sclera: Conjunctivae normal.   Cardiovascular:      Rate and Rhythm: Normal rate and regular rhythm.      Pulses: Normal pulses.      Heart sounds: No murmur heard.     No friction rub. No gallop.   Pulmonary:      Effort: Pulmonary effort is normal. No respiratory distress.      Breath sounds: Normal breath sounds. No wheezing, rhonchi or rales.   Musculoskeletal:      Right lower leg: No edema.      Left lower leg: No edema.   Skin:     General: Skin is warm and dry.   Neurological:      General: No focal deficit present.      Mental Status: She is alert and oriented to person, place, and time.   Psychiatric:         Mood and Affect: Mood normal.         Behavior: Behavior normal.         Recent [facility] Labs:  Lab Results   Component Value Date    GLUCOSE 74 08/06/2024    CALCIUM 9.3 08/06/2024     08/06/2024    K 4.0 08/06/2024    CO2 26 08/06/2024     08/06/2024    BUN 12 08/06/2024    CREATININE 0.5 (L) 08/06/2024     EKG Performed in Office today (08/06/24) normal EKG, normal sinus rhythm, unchanged from previous tracings        Tobacco  Allergies  Meds  Problems  Med Hx  Surg Hx  Fam Hx        Assessment/Plan     Diagnoses and all orders for this visit:    Pre-op evaluation  (Primary)  Assessment & Plan:  - patient is having vaserjet liposuction [place]. This is an elective procedure but the surgeon still requested basic cardiac work up and some labs listed below. She will be awake for the procedure and it would be done under local anesthesia   - her METS is 4-10   - her RCI = 0   - EKG WNL   - she is stable and at acceptable risk for upcoming surgeries, if her labs are WNL     Orders:  -     Protime-INR; Future  -     Type and screen; Future  -     ECG 12 LEAD-OFFICE PERFORMED    Screening for HIV (human immunodeficiency virus)  -     HIV 1,2 AB P24 AG; Future    Encounter for hepatitis C screening test for low risk patient  -     Hepatitis C antibody; Future    Need for hepatitis B screening test  -     Hepatitis B surface antigen; Future               Dr.Navneet Ayala,    8/6/2024

## 2024-08-06 ENCOUNTER — APPOINTMENT (OUTPATIENT)
Dept: LAB | Facility: CLINIC | Age: 39
End: 2024-08-06
Attending: FAMILY MEDICINE
Payer: COMMERCIAL

## 2024-08-06 DIAGNOSIS — R79.89 ABNORMAL CBC: ICD-10-CM

## 2024-08-06 DIAGNOSIS — Z11.59 NEED FOR HEPATITIS B SCREENING TEST: ICD-10-CM

## 2024-08-06 DIAGNOSIS — Z01.818 PRE-OP EVALUATION: ICD-10-CM

## 2024-08-06 DIAGNOSIS — F41.8 MIXED ANXIETY DEPRESSIVE DISORDER: ICD-10-CM

## 2024-08-06 DIAGNOSIS — R73.09 ABNORMAL GLUCOSE: ICD-10-CM

## 2024-08-06 DIAGNOSIS — Z13.1 SCREENING FOR DIABETES MELLITUS: ICD-10-CM

## 2024-08-06 DIAGNOSIS — E78.00 ELEVATED LDL CHOLESTEROL LEVEL: ICD-10-CM

## 2024-08-06 DIAGNOSIS — Z11.59 ENCOUNTER FOR HEPATITIS C SCREENING TEST FOR LOW RISK PATIENT: ICD-10-CM

## 2024-08-06 DIAGNOSIS — E55.9 VITAMIN D DEFICIENCY: ICD-10-CM

## 2024-08-06 DIAGNOSIS — Z11.4 SCREENING FOR HIV (HUMAN IMMUNODEFICIENCY VIRUS): ICD-10-CM

## 2024-08-06 LAB
25(OH)D3 SERPL-MCNC: 25 NG/ML (ref 30–100)
ALBUMIN SERPL-MCNC: 4.6 G/DL (ref 3.5–5.7)
ALP SERPL-CCNC: 49 IU/L (ref 34–125)
ALT SERPL-CCNC: 20 IU/L (ref 7–52)
ANION GAP SERPL CALC-SCNC: 7 MEQ/L (ref 3–15)
ANISOCYTOSIS BLD QL SMEAR: ABNORMAL
AST SERPL-CCNC: 20 IU/L (ref 13–39)
BASOPHILS # BLD: 0.03 K/UL (ref 0.01–0.1)
BASOPHILS NFR BLD: 0.9 %
BILIRUB SERPL-MCNC: 1.3 MG/DL (ref 0.3–1.2)
BUN SERPL-MCNC: 12 MG/DL (ref 7–25)
CALCIUM SERPL-MCNC: 9.3 MG/DL (ref 8.6–10.3)
CHLORIDE SERPL-SCNC: 104 MEQ/L (ref 98–107)
CHOLEST SERPL-MCNC: 173 MG/DL
CO2 SERPL-SCNC: 26 MEQ/L (ref 21–31)
CREAT SERPL-MCNC: 0.5 MG/DL (ref 0.6–1.2)
DACRYOCYTES BLD QL SMEAR: ABNORMAL
DIFFERENTIAL METHOD BLD: ABNORMAL
EGFRCR SERPLBLD CKD-EPI 2021: >60 ML/MIN/1.73M*2
EOSINOPHIL # BLD: 0.04 K/UL (ref 0.04–0.36)
EOSINOPHIL NFR BLD: 1.2 %
ERYTHROCYTE [DISTWIDTH] IN BLOOD BY AUTOMATED COUNT: 18.6 % (ref 11.7–14.4)
EST. AVERAGE GLUCOSE BLD GHB EST-MCNC: 74 MG/DL
FERRITIN SERPL-MCNC: 43 NG/ML (ref 11–250)
FOLATE SERPL-MCNC: 16.3 NG/ML
GLUCOSE SERPL-MCNC: 74 MG/DL (ref 70–99)
HBA1C MFR BLD: 4.2 %
HBV SURFACE AG SER QL: NONREACTIVE
HCT VFR BLD AUTO: 34.3 % (ref 35–45)
HCV AB SER QL: NONREACTIVE
HDLC SERPL-MCNC: 67 MG/DL
HDLC SERPL: 2.6 {RATIO}
HGB BLD-MCNC: 10.6 G/DL (ref 11.8–15.7)
HIV 1+2 AB+HIV1 P24 AG SERPL QL IA: NONREACTIVE
IMM GRANULOCYTES # BLD AUTO: 0.01 K/UL (ref 0–0.08)
IMM GRANULOCYTES NFR BLD AUTO: 0.3 %
INR PPP: 1
IRON SATN MFR SERPL: 30 % (ref 15–45)
IRON SERPL-MCNC: 98 UG/DL (ref 35–150)
LDLC SERPL CALC-MCNC: 96 MG/DL
LYMPHOCYTES # BLD: 1.22 K/UL (ref 1.2–3.5)
LYMPHOCYTES NFR BLD: 35.3 %
MCH RBC QN AUTO: 20.5 PG (ref 28–33.2)
MCHC RBC AUTO-ENTMCNC: 30.9 G/DL (ref 32.2–35.5)
MCV RBC AUTO: 66.2 FL (ref 83–98)
MICROCYTES BLD QL SMEAR: ABNORMAL
MONOCYTES # BLD: 0.28 K/UL (ref 0.28–0.8)
MONOCYTES NFR BLD: 8.1 %
NEUTROPHILS # BLD: 1.88 K/UL (ref 1.7–7)
NEUTS SEG NFR BLD: 54.2 %
NONHDLC SERPL-MCNC: 106 MG/DL
NRBC BLD-RTO: 0 %
OVALOCYTES BLD QL SMEAR: ABNORMAL
PDW BLD AUTO: ABNORMAL FL
PLAT MORPH BLD: NORMAL
PLATELET # BLD AUTO: 191 K/UL (ref 150–369)
PLATELET # BLD EST: ABNORMAL 10*3/UL
POTASSIUM SERPL-SCNC: 4 MEQ/L (ref 3.5–5.1)
PROT SERPL-MCNC: 6.8 G/DL (ref 6–8.2)
PROTHROMBIN TIME: 13.4 SEC (ref 12.2–14.5)
RBC # BLD AUTO: 5.18 M/UL (ref 3.93–5.22)
SCHISTOCYTES BLD QL SMEAR: ABNORMAL
SODIUM SERPL-SCNC: 137 MEQ/L (ref 136–145)
TARGETS BLD QL SMEAR: ABNORMAL
TIBC SERPL-MCNC: 325 UG/DL (ref 270–460)
TRIGL SERPL-MCNC: 52 MG/DL
TSH SERPL DL<=0.05 MIU/L-ACNC: 1.47 MIU/L (ref 0.34–5.6)
UIBC SERPL-MCNC: 227 UG/DL (ref 180–360)
VIT B12 SERPL-MCNC: 839 PG/ML (ref 180–914)
WBC # BLD AUTO: 3.46 K/UL (ref 3.8–10.5)

## 2024-08-06 PROCEDURE — 83550 IRON BINDING TEST: CPT

## 2024-08-06 PROCEDURE — 86803 HEPATITIS C AB TEST: CPT

## 2024-08-06 PROCEDURE — 80061 LIPID PANEL: CPT

## 2024-08-06 PROCEDURE — 84443 ASSAY THYROID STIM HORMONE: CPT

## 2024-08-06 PROCEDURE — 83036 HEMOGLOBIN GLYCOSYLATED A1C: CPT

## 2024-08-06 PROCEDURE — 82746 ASSAY OF FOLIC ACID SERUM: CPT

## 2024-08-06 PROCEDURE — 82607 VITAMIN B-12: CPT

## 2024-08-06 PROCEDURE — 87340 HEPATITIS B SURFACE AG IA: CPT

## 2024-08-06 PROCEDURE — 87389 HIV-1 AG W/HIV-1&-2 AB AG IA: CPT

## 2024-08-06 PROCEDURE — 83540 ASSAY OF IRON: CPT

## 2024-08-06 PROCEDURE — 36415 COLL VENOUS BLD VENIPUNCTURE: CPT

## 2024-08-06 PROCEDURE — 82728 ASSAY OF FERRITIN: CPT

## 2024-08-06 PROCEDURE — 85025 COMPLETE CBC W/AUTO DIFF WBC: CPT

## 2024-08-06 PROCEDURE — 82306 VITAMIN D 25 HYDROXY: CPT

## 2024-08-06 PROCEDURE — 85610 PROTHROMBIN TIME: CPT

## 2024-08-06 PROCEDURE — 80053 COMPREHEN METABOLIC PANEL: CPT

## 2024-08-06 NOTE — ASSESSMENT & PLAN NOTE
- patient is having vaserjet liposuction in her home counter. This is an elective procedure but the surgeon still requested basic cardiac work up and some labs listed below. She will be awake for the procedure and it would be done under local anesthesia   - her METS is 4-10   - her RCI = 0   - EKG WNL   - she is stable and at acceptable risk for upcoming surgeries, if her labs are WNL

## 2024-08-09 ENCOUNTER — TELEPHONE (OUTPATIENT)
Dept: PRIMARY CARE | Facility: CLINIC | Age: 39
End: 2024-08-09
Payer: COMMERCIAL

## 2025-07-02 ENCOUNTER — TELEPHONE (OUTPATIENT)
Dept: PRIMARY CARE | Facility: CLINIC | Age: 40
End: 2025-07-02

## 2025-07-02 NOTE — TELEPHONE ENCOUNTER
[facility] Appointment Request   Provider: [facility] primary care [facility]  Appointment Type?Telephone Call?   Reason for Visit: Appointment Request Phone Based?   Available Day and Time: any day, any time  Best Contact Number:   [phone]   Patient needing pcp per prev pcp changed office      The practice will reach out to schedule your appointment within the next 2 business days.

## 2025-07-03 ENCOUNTER — OFFICE VISIT (OUTPATIENT)
Dept: PRIMARY CARE | Facility: CLINIC | Age: 40
End: 2025-07-03
Payer: COMMERCIAL

## 2025-07-03 VITALS
SYSTOLIC BLOOD PRESSURE: 100 MMHG | WEIGHT: 107.8 LBS | OXYGEN SATURATION: 100 % | RESPIRATION RATE: 14 BRPM | HEART RATE: 66 BPM | BODY MASS INDEX: 21.05 KG/M2 | TEMPERATURE: 98.4 F | DIASTOLIC BLOOD PRESSURE: 70 MMHG

## 2025-07-03 DIAGNOSIS — F60.5 OBSESSIVE COMPULSIVE PERSONALITY DISORDER (CMS/HCC): ICD-10-CM

## 2025-07-03 DIAGNOSIS — G43.009 MIGRAINE WITHOUT AURA AND WITHOUT STATUS MIGRAINOSUS, NOT INTRACTABLE: ICD-10-CM

## 2025-07-03 DIAGNOSIS — E55.9 VITAMIN D DEFICIENCY: Primary | ICD-10-CM

## 2025-07-03 DIAGNOSIS — K52.9 GASTROENTEROPATHY: ICD-10-CM

## 2025-07-03 DIAGNOSIS — F41.8 MIXED ANXIETY DEPRESSIVE DISORDER: ICD-10-CM

## 2025-07-03 PROCEDURE — 3008F BODY MASS INDEX DOCD: CPT | Performed by: FAMILY MEDICINE

## 2025-07-03 PROCEDURE — 99214 OFFICE O/P EST MOD 30 MIN: CPT | Performed by: FAMILY MEDICINE

## 2025-07-03 RX ORDER — SUMATRIPTAN SUCCINATE 25 MG/1
25 TABLET ORAL ONCE AS NEEDED
Qty: 12 TABLET | Refills: 0 | Status: SHIPPED | OUTPATIENT
Start: 2025-07-03 | End: 2025-08-02

## 2025-07-03 ASSESSMENT — ENCOUNTER SYMPTOMS
DYSURIA: 0
COUGH: 0
CHILLS: 0
SORE THROAT: 0
HEADACHES: 1
FEVER: 0
EYE REDNESS: 0
WHEEZING: 0
EYE PAIN: 0
ARTHRALGIAS: 0
DIARRHEA: 0
NUMBNESS: 0
NAUSEA: 0
FREQUENCY: 0
WEAKNESS: 0
RHINORRHEA: 0
VOMITING: 1
MYALGIAS: 0
PALPITATIONS: 0

## 2025-07-03 ASSESSMENT — PAIN SCALES - GENERAL: PAINLEVEL_OUTOF10: 0-NO PAIN

## 2025-07-03 NOTE — PROGRESS NOTES
Primary Care   [address] [place] [address] [address]   [place] [address]  [phone]     Patient Name: Linnette Rivera  Age: 39 y.o.  Gender: female     HISTORY OF PRESENT ILLNESS   Linnette Rivera presents for Headache   This is a new problem. The current episode started more than 1 month ago. The problem occurs intermittently. The problem has been unchanged. The pain is located in the Retro-orbital region. The pain does not radiate. The quality of the pain is described as aching. Associated symptoms include vomiting. Pertinent negatives include no coughing, eye pain, eye redness, fever, nausea, numbness, rhinorrhea, sore throat or weakness. She has tried acetaminophen for the symptoms. The treatment provided mild relief.   Vomiting   This is a new problem. The current episode started more than 1 month ago. The problem occurs intermittently. The problem has been unchanged. The emesis has an appearance of stomach contents. Associated symptoms include headaches. Pertinent negatives include no arthralgias, chest pain, chills, coughing, diarrhea, fever or myalgias. She has tried nothing for the symptoms.         RELEVANT VISIT DATA   Appointment on 08/06/2024   Component Date Value Ref Range Status    Iron 08/06/2024 98  35 - 150 ug/dL Final    TIBC 08/06/2024 325  270 - 460 ug/dL Final    UIBC 08/06/2024 227  180 - 360 ug/dL Final    Iron Saturation 08/06/2024 30  15 - 45 % Final    WBC 08/06/2024 3.46 (L)  3.80 - 10.50 K/uL Final    ALL RESULTS HAVE BEEN CHECKED    RBC 08/06/2024 5.18  3.93 - 5.22 M/uL Final    Hemoglobin 08/06/2024 10.6 (L)  11.8 - 15.7 g/dL Final    Hematocrit 08/06/2024 34.3 (L)  35.0 - 45.0 % Final    MCV 08/06/2024 66.2 (L)  83.0 - 98.0 fL Final    MCH 08/06/2024 20.5 (L)  28.0 - 33.2 pg Final    MCHC 08/06/2024 30.9 (L)  32.2 - 35.5 g/dL Final    RDW 08/06/2024 18.6 (H)  11.7 - 14.4 % Final    Platelets 08/06/2024 191  150 - 369 K/uL Final    MPV 08/06/2024    Final    NOT MEASURED       Differential Type 08/06/2024 Auto   Final    nRBC 08/06/2024 0.0  <=0.0 % Final    Immature Granulocytes 08/06/2024 0.3  % Final    Neutrophils 08/06/2024 54.2  % Final    Lymphocytes 08/06/2024 35.3  % Final    Monocytes 08/06/2024 8.1  % Final    Eosinophils 08/06/2024 1.2  % Final    Basophils 08/06/2024 0.9  % Final    Immature Granulocytes, Absolute 08/06/2024 0.01  0.00 - 0.08 K/uL Final    Neutrophils, Absolute 08/06/2024 1.88  1.70 - 7.00 K/uL Final    Lymphocytes, Absolute 08/06/2024 1.22  1.20 - 3.50 K/uL Final    Monocytes, Absolute 08/06/2024 0.28  0.28 - 0.80 K/uL Final    Eosinophils, Absolute 08/06/2024 0.04  0.04 - 0.36 K/uL Final    Basophils, Absolute 08/06/2024 0.03  0.01 - 0.10 K/uL Final    PLT Morphology 08/06/2024 Normal   Final    Platelet Estimate 08/06/2024 Adequate (150,000-400,000)   Final    Anisocytosis 08/06/2024 1+   Final    Microcytes 08/06/2024 1+   Final    Ovalocytes 08/06/2024 1+   Final    Schistocytes 08/06/2024 Occasional   Final    Target Cells 08/06/2024 Occasional   Final    Teardrop Cells 08/06/2024 Occasional   Final    Ferritin 08/06/2024 43  11 - 250 ng/mL Final    Folate 08/06/2024 16.3  >=5.8 ng/mL Final    WHO has determined that folate concentrations less than 4 ng/mL should be considered deficient.    Vitamin B-12 08/06/2024 839  180 - 914 pg/mL Final    TSH 08/06/2024 1.47  0.34 - 5.60 mIU/L Final    Sodium 08/06/2024 137  136 - 145 mEQ/L Final    Potassium 08/06/2024 4.0  3.5 - 5.1 mEQ/L Final    Chloride 08/06/2024 104  98 - 107 mEQ/L Final    CO2 08/06/2024 26  21 - 31 mEQ/L Final    BUN 08/06/2024 12  7 - 25 mg/dL Final    Creatinine 08/06/2024 0.5 (L)  0.6 - 1.2 mg/dL Final    Glucose 08/06/2024 74  70 - 99 mg/dL Final    Calcium 08/06/2024 9.3  8.6 - 10.3 mg/dL Final    AST (SGOT) 08/06/2024 20  13 - 39 IU/L Final    ALT (SGPT) 08/06/2024 20  7 - 52 IU/L Final    Alkaline Phosphatase 08/06/2024 49  34 - 125 IU/L Final    Total Protein 08/06/2024 6.8  6.0 -  8.2 g/dL Final    Albumin 08/06/2024 4.6  3.5 - 5.7 g/dL Final    Bilirubin, Total 08/06/2024 1.3 (H)  0.3 - 1.2 mg/dL Final    eGFR 08/06/2024 >60.0  >=60.0 mL/min/1.73m*2 Final    Calculation based on the Chronic Kidney Disease Epidemiology Collaboration (CKD-EPI) equation refit without adjustment for race.    Anion Gap 08/06/2024 7  3 - 15 mEQ/L Final    Triglycerides 08/06/2024 52  <150 mg/dL Final    Cholesterol 08/06/2024 173  <=200 mg/dL Final    HDL 08/06/2024 67  >=50 mg/dL Final    2001 NCEP GUIDELINES  <40 mg/dL Low  >=60 mg/dL High    LDL Calculated 08/06/2024 96  <=100 mg/dL Final    ATP III GUIDELINES  Optimal: <100 mg/dL  Near/Above Optimal: 100-129 mg/dL  Borderline High: 130-159 mg/dL  High: 160-189 mg/dL  Very High: >190 mg/dL      Non-HDL, Calculated 08/06/2024 106  mg/dL Final    RISK 08/06/2024 2.6  <=5.0 Final    RISK FEMALE ([place] STUDY DATA)  1/2 Average 3.3  Average 4.4  2X Average 7.1  3X Average 11.0    Vit D, 25-Hydroxy 08/06/2024 25 (L)  30 - 100 ng/mL Final    INTERPRETATION  VITAMIN D STATUS   25 OH VITAMIN D    Deficiency          <20 ng/mL    Insufficiency     20-29 ng/mL    Sufficiency       ng/mL    Toxicity           >100 ng/mL  This test measures a total of both 25 OH D2 and 25 OH D3.    Hemoglobin A1C 08/06/2024 4.2  <5.7 % Final    Estimated Ave Glucose 08/06/2024 74  mg/dL Final    Estimate of average glucose concentration continuously over 24 hours for previous 2 to 3 months(Per ADA Recommendation).    PT 08/06/2024 13.4  12.2 - 14.5 sec Final    INR 08/06/2024 1.0    Final    INR has no defined significance when PT is within Reference Range.    Hepatitis C Ab 08/06/2024 Nonreactive  Nonreactive Final    Hepatitis B Surface Ag 08/06/2024 Nonreactive  Nonreactive Final    HIV 1,2 Ab P24 Ag 08/06/2024 Nonreactive  Nonreactive Final         VITALS   Vitals:    07/03/25 1457   BP: 100/70   Pulse: 66   Resp: 14   Temp: 36.9 °C (98.4 °F)   SpO2: 100%   Weight: 48.9 kg  (107 lb 12.8 oz)     Body mass index is 21.05 kg/m².     PAST MEDICAL AND SURGICAL HISTORY   Past Medical History:   Diagnosis Date    Chronic constipation 02/02/2022    Endometriosis     PROCEDURE AT [facility] 9/30/23    Iron deficiency 02/02/2022    Lumbar radiculopathy 02/01/2022    Mixed anxiety depressive disorder 02/01/2022    Vitamin D deficiency 05/18/2022       No past surgical history on file.     MEDICATIONS     Current Outpatient Medications:     escitalopram (LEXAPRO) 20 mg tablet, Take 1.5 tablets (30 mg total) by mouth daily., Disp: 135 tablet, Rfl: 3     ALLERGIES   Thimerosal     FAMILY HISTORY   Family History   Problem Relation Name Age of Onset    Diabetes Biological Mother      Hypertension Biological Mother      Stroke Biological Mother      Alcohol abuse Biological Father          SOCIAL HISTORY   Social History     Socioeconomic History    Marital status:    Tobacco Use    Smoking status: Never    Smokeless tobacco: Never   Vaping Use    Vaping status: Never Used   Substance and Sexual Activity    Alcohol use: Not Currently    Drug use: Never    Sexual activity: Yes     Partners: Male        REVIEW OF SYSTEMS   Review of Systems   Constitutional:  Negative for chills and fever.   HENT:  Negative for rhinorrhea and sore throat.    Eyes:  Negative for pain and redness.   Respiratory:  Negative for cough and wheezing.    Cardiovascular:  Negative for chest pain and palpitations.   Gastrointestinal:  Positive for vomiting. Negative for diarrhea and nausea.   Genitourinary:  Negative for dysuria and frequency.   Musculoskeletal:  Negative for arthralgias and myalgias.   Neurological:  Positive for headaches. Negative for weakness and numbness.         PHYSICAL EXAMINATION   Physical Exam  Constitutional:       General: She is not in acute distress.     Appearance: She is not ill-appearing or diaphoretic.   HENT:      Head: Normocephalic and atraumatic.      Nose: No congestion or  rhinorrhea.   Eyes:      Extraocular Movements: Extraocular movements intact.      Conjunctiva/sclera: Conjunctivae normal.      Pupils: Pupils are equal, round, and reactive to light.   Cardiovascular:      Rate and Rhythm: Normal rate and regular rhythm.   Pulmonary:      Effort: Pulmonary effort is normal.      Breath sounds: Normal breath sounds.   Abdominal:      General: Abdomen is flat. Bowel sounds are normal.      Palpations: Abdomen is soft.   Musculoskeletal:         General: Normal range of motion.   Skin:     General: Skin is warm and dry.   Neurological:      General: No focal deficit present.      Mental Status: She is alert.           ASSESSMENT AND PLAN     Assessment & Plan  Vitamin D deficiency  Uncontrolled, Vitamin D level low last labs, advised to continue Vitamin D supplement, repeat labs  Orders:    Comprehensive metabolic panel; Future    CBC and Differential; Future    Lipid panel; Future    Hemoglobin A1c; Future    TSH w reflex FT4; Future    Vitamin D 25 hydroxy; Future    Iron and TIBC; Future    Ferritin; Future    Obsessive compulsive personality disorder (CMS/HCC)  Stable, continue to monitor       Mixed anxiety depressive disorder  Stable, denies worsening anxiety and depression, continue to monitor on Lexapro       Migraine without aura and without status migrainosus, not intractable  Uncontrolled complains of worsening headaches, start trial of Sumatriptan  Orders:    SUMAtriptan (IMITREX) 25 mg tablet; Take 1 tablet (25 mg total) by mouth once as needed for migraine. May repeat dose once in 2hr if no relief. Do not exceed 2 doses in 24hr.    Gastroenteropathy  Uncontrolled, complains of nausea, vomiting, diarrhea, constipation on and off for over a month, advised to follow up with GI  Orders:    Ambulatory referral to Gastroenterology; Future         I spent 35 minutes on this date of service performing the following activities: obtaining history, performing examination, entering  orders, documenting, preparing for visit, providing counseling and education, communicating results, and coordinating care.      Zackary Guadarrama DO  07/03/25  3:04 PM

## 2025-08-08 ENCOUNTER — APPOINTMENT (OUTPATIENT)
Dept: LAB | Facility: CLINIC | Age: 40
End: 2025-08-08
Attending: FAMILY MEDICINE
Payer: COMMERCIAL

## 2025-08-08 DIAGNOSIS — R14.0 GASTRIC TYMPANY: ICD-10-CM

## 2025-08-08 DIAGNOSIS — R68.81 EARLY SATIETY: ICD-10-CM

## 2025-08-08 DIAGNOSIS — E55.9 VITAMIN D DEFICIENCY: ICD-10-CM

## 2025-08-08 DIAGNOSIS — K59.09 OTHER CONSTIPATION: ICD-10-CM

## 2025-08-08 LAB
25(OH)D3 SERPL-MCNC: 20 NG/ML (ref 30–100)
ACANTHOCYTES BLD QL SMEAR: ABNORMAL
ALBUMIN SERPL-MCNC: 4.4 G/DL (ref 3.5–5.7)
ALP SERPL-CCNC: 52 IU/L (ref 34–125)
ALT SERPL-CCNC: 11 IU/L (ref 7–52)
ANION GAP SERPL CALC-SCNC: 5 MEQ/L (ref 3–15)
ANISOCYTOSIS BLD QL SMEAR: ABNORMAL
AST SERPL-CCNC: 15 IU/L (ref 13–39)
BASOPHILS # BLD: 0 K/UL (ref 0.01–0.1)
BASOPHILS NFR BLD: 0 %
BILIRUB SERPL-MCNC: 1 MG/DL (ref 0.3–1.2)
BUN SERPL-MCNC: 13 MG/DL (ref 7–25)
CALCIUM SERPL-MCNC: 9.2 MG/DL (ref 8.6–10.3)
CHLORIDE SERPL-SCNC: 106 MEQ/L (ref 98–107)
CHOLEST SERPL-MCNC: 159 MG/DL
CO2 SERPL-SCNC: 26 MEQ/L (ref 21–31)
CREAT SERPL-MCNC: 0.6 MG/DL (ref 0.6–1.2)
DACRYOCYTES BLD QL SMEAR: ABNORMAL
DIFFERENTIAL METHOD BLD: ABNORMAL
EGFRCR SERPLBLD CKD-EPI 2021: >60 ML/MIN/1.73M*2
EOSINOPHIL # BLD: 0 K/UL (ref 0.04–0.36)
EOSINOPHIL NFR BLD: 0 %
ERYTHROCYTE [DISTWIDTH] IN BLOOD BY AUTOMATED COUNT: 17.5 % (ref 11.7–14.4)
EST. AVERAGE GLUCOSE BLD GHB EST-MCNC: 74 MG/DL
FERRITIN SERPL-MCNC: 43 NG/ML (ref 11–250)
GLUCOSE SERPL-MCNC: 83 MG/DL (ref 70–99)
HBA1C MFR BLD: 4.2 %
HCT VFR BLD AUTO: 33.7 % (ref 35–45)
HDLC SERPL-MCNC: 67 MG/DL
HDLC SERPL: 2.4 {RATIO}
HGB BLD-MCNC: 10.8 G/DL (ref 11.8–15.7)
HYPOCHROMIA BLD QL SMEAR: ABNORMAL
IRON SATN MFR SERPL: 27 % (ref 15–45)
IRON SERPL-MCNC: 88 UG/DL (ref 35–150)
LDLC SERPL CALC-MCNC: 84 MG/DL
LYMPHOCYTES # BLD: 1.13 K/UL (ref 1.2–3.5)
LYMPHOCYTES NFR BLD: 23 %
MCH RBC QN AUTO: 20.2 PG (ref 28–33.2)
MCHC RBC AUTO-ENTMCNC: 32 G/DL (ref 32.2–35.5)
MCV RBC AUTO: 63 FL (ref 83–98)
MICROCYTES BLD QL SMEAR: ABNORMAL
MONOCYTES # BLD: 0.1 K/UL (ref 0.28–0.8)
MONOCYTES NFR BLD: 2 %
NEUTS BAND # BLD: 3.68 K/UL (ref 1.7–7)
NEUTS SEG NFR BLD: 75 %
NONHDLC SERPL-MCNC: 92 MG/DL
OVALOCYTES BLD QL SMEAR: ABNORMAL
PLAT MORPH BLD: NORMAL
PLATELET # BLD AUTO: 204 K/UL (ref 150–369)
PLATELET # BLD EST: ABNORMAL 10*3/UL
PMV BLD AUTO: ABNORMAL FL
POTASSIUM SERPL-SCNC: 4 MEQ/L (ref 3.5–5.1)
PROT SERPL-MCNC: 6.5 G/DL (ref 6–8.2)
RBC # BLD AUTO: 5.35 M/UL (ref 3.93–5.22)
SCHISTOCYTES BLD QL SMEAR: ABNORMAL
SODIUM SERPL-SCNC: 137 MEQ/L (ref 136–145)
TARGETS BLD QL SMEAR: ABNORMAL
TIBC SERPL-MCNC: 330 UG/DL (ref 270–460)
TRIGL SERPL-MCNC: 42 MG/DL
TSH SERPL DL<=0.05 MIU/L-ACNC: 2.11 MIU/L (ref 0.34–5.6)
TSH SERPL DL<=0.05 MIU/L-ACNC: 2.11 MIU/L (ref 0.34–5.6)
UIBC SERPL-MCNC: 242 UG/DL (ref 180–360)
WBC # BLD AUTO: 4.91 K/UL (ref 3.8–10.5)

## 2025-08-08 PROCEDURE — 83036 HEMOGLOBIN GLYCOSYLATED A1C: CPT

## 2025-08-08 PROCEDURE — 84443 ASSAY THYROID STIM HORMONE: CPT

## 2025-08-08 PROCEDURE — 86364 TISS TRNSGLTMNASE EA IG CLAS: CPT

## 2025-08-08 PROCEDURE — 80053 COMPREHEN METABOLIC PANEL: CPT

## 2025-08-08 PROCEDURE — 83550 IRON BINDING TEST: CPT

## 2025-08-08 PROCEDURE — 80061 LIPID PANEL: CPT

## 2025-08-08 PROCEDURE — 85025 COMPLETE CBC W/AUTO DIFF WBC: CPT

## 2025-08-08 PROCEDURE — 36415 COLL VENOUS BLD VENIPUNCTURE: CPT

## 2025-08-08 PROCEDURE — 82784 ASSAY IGA/IGD/IGG/IGM EACH: CPT

## 2025-08-08 PROCEDURE — 82728 ASSAY OF FERRITIN: CPT

## 2025-08-08 PROCEDURE — 82306 VITAMIN D 25 HYDROXY: CPT

## 2025-08-10 LAB — IGA SERPL-MCNC: 292 MG/DL (ref 84.5–499)

## 2025-08-11 LAB — TTG IGA SER IA-ACNC: 0.4 ELIA U/ML

## 2025-08-19 ENCOUNTER — OFFICE VISIT (OUTPATIENT)
Dept: PRIMARY CARE | Facility: CLINIC | Age: 40
End: 2025-08-19
Payer: COMMERCIAL

## 2025-08-19 VITALS
HEART RATE: 70 BPM | DIASTOLIC BLOOD PRESSURE: 78 MMHG | HEIGHT: 61 IN | BODY MASS INDEX: 20.32 KG/M2 | OXYGEN SATURATION: 99 % | SYSTOLIC BLOOD PRESSURE: 124 MMHG | WEIGHT: 107.6 LBS | TEMPERATURE: 97.8 F | RESPIRATION RATE: 18 BRPM

## 2025-08-19 DIAGNOSIS — K52.9 GASTROENTEROPATHY: Primary | ICD-10-CM

## 2025-08-19 DIAGNOSIS — Z12.31 BREAST CANCER SCREENING BY MAMMOGRAM: ICD-10-CM

## 2025-08-19 DIAGNOSIS — D64.9 ANEMIA, UNSPECIFIED TYPE: ICD-10-CM

## 2025-08-19 PROCEDURE — 3008F BODY MASS INDEX DOCD: CPT | Performed by: FAMILY MEDICINE

## 2025-08-19 PROCEDURE — 99214 OFFICE O/P EST MOD 30 MIN: CPT | Performed by: FAMILY MEDICINE

## 2025-08-19 ASSESSMENT — ENCOUNTER SYMPTOMS
COUGH: 0
CHILLS: 0
FREQUENCY: 0
NAUSEA: 0
WHEEZING: 0
SORE THROAT: 0
FEVER: 0
RHINORRHEA: 0
MYALGIAS: 0
ARTHRALGIAS: 0
DYSURIA: 0
VOMITING: 0
EYE PAIN: 0
WEAKNESS: 0
DIARRHEA: 0
PALPITATIONS: 0
NUMBNESS: 0
EYE REDNESS: 0

## 2025-08-19 ASSESSMENT — PATIENT HEALTH QUESTIONNAIRE - PHQ9: SUM OF ALL RESPONSES TO PHQ9 QUESTIONS 1 & 2: 1

## 2025-08-22 ENCOUNTER — HOSPITAL ENCOUNTER (OUTPATIENT)
Dept: RADIOLOGY | Facility: CLINIC | Age: 40
Discharge: HOME | End: 2025-08-22
Attending: FAMILY MEDICINE
Payer: COMMERCIAL

## 2025-08-22 DIAGNOSIS — Z12.31 BREAST CANCER SCREENING BY MAMMOGRAM: ICD-10-CM

## 2025-08-22 PROCEDURE — 77067 SCR MAMMO BI INCL CAD: CPT

## 2025-08-26 ENCOUNTER — TELEPHONE (OUTPATIENT)
Dept: PRIMARY CARE | Facility: CLINIC | Age: 40
End: 2025-08-26
Payer: COMMERCIAL

## 2025-09-04 ENCOUNTER — HOSPITAL ENCOUNTER (OUTPATIENT)
Dept: RADIOLOGY | Facility: CLINIC | Age: 40
Discharge: HOME | End: 2025-09-04
Attending: FAMILY MEDICINE
Payer: COMMERCIAL

## 2025-09-04 DIAGNOSIS — R92.8 ABNORMAL MAMMOGRAM: ICD-10-CM

## 2025-09-04 PROCEDURE — 76642 ULTRASOUND BREAST LIMITED: CPT | Mod: RT

## 2025-09-04 PROCEDURE — G0279 TOMOSYNTHESIS, MAMMO: HCPCS | Mod: RT
